# Patient Record
Sex: FEMALE | Race: WHITE | NOT HISPANIC OR LATINO | Employment: UNEMPLOYED | URBAN - METROPOLITAN AREA
[De-identification: names, ages, dates, MRNs, and addresses within clinical notes are randomized per-mention and may not be internally consistent; named-entity substitution may affect disease eponyms.]

---

## 2017-11-16 ENCOUNTER — LAB REQUISITION (OUTPATIENT)
Dept: LAB | Facility: HOSPITAL | Age: 22
End: 2017-11-16

## 2017-11-16 DIAGNOSIS — N91.0 PRIMARY AMENORRHEA: ICD-10-CM

## 2017-11-16 DIAGNOSIS — Z00.00 ROUTINE GENERAL MEDICAL EXAMINATION AT A HEALTH CARE FACILITY: ICD-10-CM

## 2017-11-16 PROCEDURE — 36415 COLL VENOUS BLD VENIPUNCTURE: CPT | Performed by: NURSE PRACTITIONER

## 2019-12-11 ENCOUNTER — LAB REQUISITION (OUTPATIENT)
Dept: LAB | Facility: HOSPITAL | Age: 24
End: 2019-12-11

## 2019-12-11 DIAGNOSIS — Z00.00 ROUTINE GENERAL MEDICAL EXAMINATION AT A HEALTH CARE FACILITY: ICD-10-CM

## 2019-12-11 LAB — HCG INTACT+B SERPL-ACNC: <0.5 MIU/ML

## 2019-12-11 PROCEDURE — 84702 CHORIONIC GONADOTROPIN TEST: CPT

## 2019-12-11 PROCEDURE — 36415 COLL VENOUS BLD VENIPUNCTURE: CPT | Performed by: NURSE PRACTITIONER

## 2019-12-20 ENCOUNTER — LAB REQUISITION (OUTPATIENT)
Dept: LAB | Facility: HOSPITAL | Age: 24
End: 2019-12-20

## 2019-12-20 DIAGNOSIS — Z00.00 ROUTINE GENERAL MEDICAL EXAMINATION AT A HEALTH CARE FACILITY: ICD-10-CM

## 2019-12-20 PROCEDURE — 36415 COLL VENOUS BLD VENIPUNCTURE: CPT | Performed by: OBSTETRICS & GYNECOLOGY

## 2020-12-14 ENCOUNTER — OFFICE VISIT (OUTPATIENT)
Dept: OBSTETRICS AND GYNECOLOGY | Facility: CLINIC | Age: 25
End: 2020-12-14

## 2020-12-14 VITALS
HEIGHT: 62 IN | SYSTOLIC BLOOD PRESSURE: 120 MMHG | WEIGHT: 127 LBS | TEMPERATURE: 97.7 F | DIASTOLIC BLOOD PRESSURE: 78 MMHG | BODY MASS INDEX: 23.37 KG/M2

## 2020-12-14 DIAGNOSIS — Z11.3 SCREENING EXAMINATION FOR STD (SEXUALLY TRANSMITTED DISEASE): ICD-10-CM

## 2020-12-14 DIAGNOSIS — Z72.0 TOBACCO ABUSE: ICD-10-CM

## 2020-12-14 DIAGNOSIS — Z01.419 WOMEN'S ANNUAL ROUTINE GYNECOLOGICAL EXAMINATION: Primary | ICD-10-CM

## 2020-12-14 PROCEDURE — 99395 PREV VISIT EST AGE 18-39: CPT | Performed by: OBSTETRICS & GYNECOLOGY

## 2020-12-14 RX ORDER — VARENICLINE TARTRATE 1 MG/1
1 TABLET, FILM COATED ORAL 2 TIMES DAILY
Qty: 56 TABLET | Refills: 1 | Status: SHIPPED | OUTPATIENT
Start: 2021-01-11 | End: 2021-03-08

## 2020-12-14 NOTE — PATIENT INSTRUCTIONS
Breast Self-Awareness  Breast self-awareness is knowing how your breasts look and feel. Doing breast self-awareness is important. It allows you to catch a breast problem early while it is still small and can be treated. All women should do breast self-awareness, including women who have had breast implants. Tell your doctor if you notice a change in your breasts.  What you need:  · A mirror.  · A well-lit room.  How to do a breast self-exam  A breast self-exam is one way to learn what is normal for your breasts and to check for changes. To do a breast self-exam:  Look for changes    1. Take off all the clothes above your waist.  2.  front of a mirror in a room with good lighting.  3. Put your hands on your hips.  4. Push your hands down.  5. Look at your breasts and nipples in the mirror to see if one breast or nipple looks different from the other. Check to see if:  ? The shape of one breast is different.  ? The size of one breast is different.  ? There are wrinkles, dips, and bumps in one breast and not the other.  6. Look at each breast for changes in the skin, such as:  ? Redness.  ? Scaly areas.  7. Look for changes in your nipples, such as:  ? Liquid around the nipples.  ? Bleeding.  ? Dimpling.  ? Redness.  ? A change in where the nipples are.  Feel for changes    1. Lie on your back on the floor.  2. Feel each breast. To do this, follow these steps:  ? Pick a breast to feel.  ? Put the arm closest to that breast above your head.  ? Use your other arm to feel the nipple area of your breast. Feel the area with the pads of your three middle fingers by making small circles with your fingers. For the first [place], press lightly. For the second [place], press harder. For the third [place], press even harder.  ? Keep making circles with your fingers at the different pressures as you move down your breast. Stop when you feel your ribs.  ? Move your fingers a little toward the center of your body.  ? Start  making circles with your fingers again, this time going up until you reach your collarbone.  ? Keep making up-and-down circles until you reach your armpit. Remember to keep using the three pressures.  ? Feel the other breast in the same way.  3. Sit or  the tub or shower.  4. With soapy water on your skin, feel each breast the same way you did in step 2 when you were lying on the floor.  Write down what you find  Writing down what you find can help you remember what to tell your doctor. Write down:  · What is normal for each breast.  · Any changes you find in each breast, including:  ? The kind of changes you find.  ? Whether you have pain.  ? Size and location of any lumps.  · When you last had your menstrual period.  General tips  · Check your breasts every month.  · If you are breastfeeding, the best time to check your breasts is after you feed your baby or after you use a breast pump.  · If you get menstrual periods, the best time to check your breasts is 5-7 days after your menstrual period is over.  · With time, you will become comfortable with the self-exam, and you will begin to know if there are changes in your breasts.  Contact a doctor if you:  · See a change in the shape or size of your breasts or nipples.  · See a change in the skin of your breast or nipples, such as red or scaly skin.  · Have fluid coming from your nipples that is not normal.  · Find a lump or thick area that was not there before.  · Have pain in your breasts.  · Have any concerns about your breast health.  Summary  · Breast self-awareness includes looking for changes in your breasts, as well as feeling for changes within your breasts.  · Breast self-awareness should be done in front of a mirror in a well-lit room.  · You should check your breasts every month. If you get menstrual periods, the best time to check your breasts is 5-7 days after your menstrual period is over.  · Let your doctor know of any changes you see in your  breasts, including changes in size, changes on the skin, pain or tenderness, or fluid from your nipples that is not normal.  This information is not intended to replace advice given to you by your health care provider. Make sure you discuss any questions you have with your health care provider.  Document Revised: 08/06/2019 Document Reviewed: 08/06/2019  Elsevier Patient Education © 2020 [employer]

## 2020-12-14 NOTE — PROGRESS NOTES
GYN Annual Exam     CC- Here for annual exam.     Subjective     HPI  Lluvia Franks is a 25 y.o. female established patient who presents for annual well woman exam. Her periods are regular every 28-30 days, lasting 5 days and are moderate and clots are sparse.  She denies  dysmenorrhea.  SPartner Status: Marital Status: single.  New Partners since last visit: YES/NO/Other: no.  Condom usage with IC: always.    The patient does not have any complaints today.    She exercises regularly: no.  She has concerns about domestic violence: no.    OB History        2    Para   1    Term   1            AB   1    Living   1       SAB        TAB        Ectopic        Molar        Multiple        Live Births   1                Current contraception: condoms  History of abnormal Pap smear: no  Family history of uterine, colon or ovarian cancer: yes - PGM: colon cancer  Family history of breast cancer: no  H/o STDs: Negative  Last pap: 2018  Gardasil:refuses  Thromboembolic Disease: none    Health Maintenance   Topic Date Due   • ANNUAL PHYSICAL  1998   • HPV VACCINES (1 - 2-dose series) 2006   • TDAP/TD VACCINES (1 - Tdap) 2014   • INFLUENZA VACCINE  2020   • HEPATITIS C SCREENING  2020   • PAP SMEAR  2021   • Annual Gynecologic Pelvic and Breast Exam  12/15/2021   • Pneumococcal Vaccine 0-64  Aged Out       The following portions of the patient's history were reviewed and updated as appropriate: allergies, current medications, past family history, past medical history, past social history, past surgical history and problem list.    Review of Systems  Review of Systems   Constitutional: Negative.    HENT: Negative.    Eyes: Negative.    Respiratory: Negative.    Cardiovascular: Negative.    Gastrointestinal: Negative.    Endocrine: Negative.    Genitourinary: Negative.    Musculoskeletal: Negative.    Skin: Negative.    Allergic/Immunologic: Negative.    Neurological:  "Negative.    Hematological: Negative.    Psychiatric/Behavioral: Negative.        I have reviewed and agree with the HPI, ROS, and historical information as entered above. Daphne Evans MD      Past Medical History:   Diagnosis Date   • Raynaud disease         Past Surgical History:   Procedure Laterality Date   • NO PAST SURGERIES        Lluvia Franks  has no history on file for tobacco.. She smokes 1 PPD.  I have educated her on the risk of diseases from using tobacco products such as cancer, COPD and heart disease.     I advised her to quit and she is willing to quit. We have discussed the following method/s for tobacco cessation:  Counseling Prescription Medicaiton.  Together we have set a quit date for 1 month from today.  She will follow up with me in several months or sooner to check on her progress.    I spent 5 minutes counseling the patient.           Objective   /78   Temp 97.7 °F (36.5 °C)   Ht 157.5 cm (62\")   Wt 57.6 kg (127 lb)   LMP 11/23/2020   Breastfeeding No   BMI 23.23 kg/m²     Physical Exam  Vitals signs and nursing note reviewed. Exam conducted with a chaperone present.   Constitutional:       General: She is awake.   HENT:      Head: Normocephalic and atraumatic.   Neck:      Musculoskeletal: Normal range of motion.      Thyroid: No thyroid mass, thyromegaly or thyroid tenderness.   Cardiovascular:      Rate and Rhythm: Normal rate.      Heart sounds: No murmur. No friction rub. No gallop.    Pulmonary:      Effort: Pulmonary effort is normal.      Breath sounds: Normal breath sounds. No stridor. No wheezing, rhonchi or rales.   Chest:      Chest wall: No mass or tenderness.      Breasts:         Right: Normal. No bleeding, inverted nipple, mass, nipple discharge, skin change or tenderness.         Left: Normal. No bleeding, inverted nipple, mass, nipple discharge, skin change or tenderness.   Abdominal:      Palpations: Abdomen is soft.      Tenderness: There is no guarding or " rebound.      Hernia: There is no hernia in the left inguinal area or right inguinal area.   Genitourinary:     General: Normal vulva.      Pubic Area: No rash.       Labia:         Right: No rash, tenderness, lesion or injury.         Left: No rash, tenderness, lesion or injury.       Urethra: No prolapse, urethral swelling or urethral lesion.      Vagina: No foreign body. No vaginal discharge, erythema, tenderness, bleeding or lesions.      Cervix: No cervical motion tenderness, discharge, friability, lesion, erythema or cervical bleeding.      Uterus: Normal. Not deviated, not enlarged, not fixed and not tender.       Adnexa: Right adnexa normal and left adnexa normal.        Right: No mass, tenderness or fullness.          Left: No mass, tenderness or fullness.        Rectum: No external hemorrhoid.   Lymphadenopathy:      Upper Body:      Right upper body: No supraclavicular or axillary adenopathy.      Left upper body: No supraclavicular or axillary adenopathy.   Skin:     General: Skin is warm.   Neurological:      Mental Status: She is alert and oriented to person, place, and time.   Psychiatric:         Mood and Affect: Mood and affect normal.         Speech: Speech normal.          Assessment   Assessment  Problem List Items Addressed This Visit     None      Visit Diagnoses     Women's annual routine gynecological examination    -  Primary    Relevant Orders    Pap IG, Ct-Ng, HPV-hr    Screening examination for STD (sexually transmitted disease)        Tobacco abuse                Assessment     Problem List Items Addressed This Visit     None      Visit Diagnoses     Women's annual routine gynecological examination    -  Primary    Relevant Orders    Pap IG, Ct-Ng, HPV-hr    Screening examination for STD (sexually transmitted disease)        Tobacco abuse                Plan     1. Encouraged use of condoms for STD prevention.  2. Recommended use of Vitamin D replacement and getting adequate calcium in  her diet. (1500mg)  3. Reviewed monthly self breast exams.  Instructed to call with lumps, pain, or breast discharge.    4. Reccommended Flu Vaccine in Fall of each year.  5. RTC in 1 year or PRN with problems  6. Happy with condoms  7. Declines Gardasil for now  8. Patient smokes 1 pack/day.  Was counseled on importance of tobacco cessation.  Trial of medical therapy.  Risks discussed.         Daphne Evans MD  12/14/2020

## 2020-12-29 DIAGNOSIS — Z01.419 WOMEN'S ANNUAL ROUTINE GYNECOLOGICAL EXAMINATION: ICD-10-CM

## 2022-01-10 ENCOUNTER — OFFICE VISIT (OUTPATIENT)
Dept: OBSTETRICS AND GYNECOLOGY | Facility: CLINIC | Age: 27
End: 2022-01-10

## 2022-01-10 VITALS
SYSTOLIC BLOOD PRESSURE: 100 MMHG | DIASTOLIC BLOOD PRESSURE: 80 MMHG | WEIGHT: 121 LBS | HEIGHT: 63 IN | BODY MASS INDEX: 21.44 KG/M2

## 2022-01-10 DIAGNOSIS — Z01.419 ENCOUNTER FOR GYNECOLOGICAL EXAMINATION WITHOUT ABNORMAL FINDING: Primary | ICD-10-CM

## 2022-01-10 DIAGNOSIS — Z01.419 PAP TEST, AS PART OF ROUTINE GYNECOLOGICAL EXAMINATION: ICD-10-CM

## 2022-01-10 PROCEDURE — 3008F BODY MASS INDEX DOCD: CPT | Performed by: OBSTETRICS & GYNECOLOGY

## 2022-01-10 PROCEDURE — 99395 PREV VISIT EST AGE 18-39: CPT | Performed by: OBSTETRICS & GYNECOLOGY

## 2022-01-10 PROCEDURE — 2014F MENTAL STATUS ASSESS: CPT | Performed by: OBSTETRICS & GYNECOLOGY

## 2022-01-10 NOTE — PROGRESS NOTES
Gynecologic Annual Exam Note        CC- Here for annual exam.     Subjective     HPI  Lluvia Franks is a 26 y.o. female established patient who presents for annual well woman exam. 2021.  Her periods are regular every 28-30 days, lasting 5 days and are moderate and clots are present.  She denies  dysmenorrhea.  SPartner Status: Marital Status: single.  New Partners since last visit: YES/NO/Other: no.  Condom usage with IC: always.    The patient does not have any complaints today.    She exercises regularly: yes.  She has concerns about domestic violence: no.    OB History        2    Para   1    Term   1            AB   1    Living   1       SAB        IAB        Ectopic        Molar        Multiple        Live Births   1                Current contraception: none  History of abnormal Pap smear: no  Family history of uterine, colon or ovarian cancer: yes - PGM colon   Family history of breast cancer: no  H/o STDs: no  Last pap:  Gardasil:declines     Last Pap :   Last Completed Pap Smear          PAP SMEAR (Every 3 Years) Next due on 12/15/2023    12/15/2020  Pap IG, Ct-Ng, HPV-hr    2018  Done - Negative, GC/Ch negative                Health Maintenance   Topic Date Due   • ANNUAL PHYSICAL  Never done   • COVID-19 Vaccine (1) Never done   • HPV VACCINES (1 - 2-dose series) Never done   • HEPATITIS C SCREENING  Never done   • INFLUENZA VACCINE  Never done   • Annual Gynecologic Pelvic and Breast Exam  12/15/2021   • PAP SMEAR  12/15/2023   • TDAP/TD VACCINES (3 - Td or Tdap) 2027   • Pneumococcal Vaccine 0-64  Aged Out       The following portions of the patient's history were reviewed and updated as appropriate: allergies, current medications, past family history, past medical history, past social history, past surgical history and problem list.    Review of Systems  Review of Systems    I have reviewed and agree with the HPI, ROS, and historical information as entered  "above. Daphne Evans MD      Past Medical History:   Diagnosis Date   • Raynaud disease         Past Surgical History:   Procedure Laterality Date   • NO PAST SURGERIES            Objective   /80   Ht 160 cm (63\")   Wt 54.9 kg (121 lb)   LMP 12/13/2021   BMI 21.43 kg/m²     Physical Exam  Vitals and nursing note reviewed. Exam conducted with a chaperone present.   Constitutional:       General: She is awake.   HENT:      Head: Normocephalic and atraumatic.   Neck:      Thyroid: No thyroid mass, thyromegaly or thyroid tenderness.   Cardiovascular:      Rate and Rhythm: Normal rate.      Heart sounds: No murmur heard.  No friction rub. No gallop.    Pulmonary:      Effort: Pulmonary effort is normal.      Breath sounds: Normal breath sounds. No stridor. No wheezing, rhonchi or rales.   Chest:      Chest wall: No mass or tenderness.   Breasts:      Right: Normal. No bleeding, inverted nipple, mass, nipple discharge, skin change, tenderness, axillary adenopathy or supraclavicular adenopathy.      Left: Normal. No bleeding, inverted nipple, mass, nipple discharge, skin change, tenderness, axillary adenopathy or supraclavicular adenopathy.       Abdominal:      Palpations: Abdomen is soft.      Tenderness: There is no guarding or rebound.      Hernia: There is no hernia in the left inguinal area or right inguinal area.   Genitourinary:     General: Normal vulva.      Pubic Area: No rash.       Labia:         Right: No rash, tenderness, lesion or injury.         Left: No rash, tenderness, lesion or injury.       Urethra: No prolapse, urethral swelling or urethral lesion.      Vagina: No foreign body. No vaginal discharge, erythema, tenderness, bleeding or lesions.      Cervix: No cervical motion tenderness, discharge, friability, lesion, erythema or cervical bleeding.      Uterus: Normal. Not deviated, not enlarged, not fixed and not tender.       Adnexa: Right adnexa normal and left adnexa normal.        Right: " No mass, tenderness or fullness.          Left: No mass, tenderness or fullness.        Rectum: No external hemorrhoid.   Musculoskeletal:      Cervical back: Normal range of motion.   Lymphadenopathy:      Upper Body:      Right upper body: No supraclavicular or axillary adenopathy.      Left upper body: No supraclavicular or axillary adenopathy.   Skin:     General: Skin is warm.   Neurological:      Mental Status: She is alert and oriented to person, place, and time.   Psychiatric:         Mood and Affect: Mood and affect normal.         Speech: Speech normal.          Assessment   Assessment  Problem List Items Addressed This Visit     None      Visit Diagnoses     Encounter for gynecological examination without abnormal finding    -  Primary    Pap test, as part of routine gynecological examination        Relevant Orders    IGP,CtNgTv,Apt HPV            Assessment     Problem List Items Addressed This Visit     None      Visit Diagnoses     Encounter for gynecological examination without abnormal finding    -  Primary    Pap test, as part of routine gynecological examination        Relevant Orders    IGP,CtNgTv,Apt HPV            Plan     1. Encouraged use of condoms for STD prevention.  2. Reviewed monthly self breast exams.  Instructed to call with lumps, pain, or breast discharge.    3. RTC in 1 year or PRN with problems  4. Happy with Condoms  5. Declines Gardasil         Daphne Evans MD  01/10/2022

## 2022-01-10 NOTE — PATIENT INSTRUCTIONS
Breast Self-Awareness  Breast self-awareness is knowing how your breasts look and feel. Doing breast self-awareness is important. It allows you to catch a breast problem early while it is still small and can be treated. All women should do breast self-awareness, including women who have had breast implants. Tell your doctor if you notice a change in your breasts.  What you need:  · A mirror.  · A well-lit room.  How to do a breast self-exam  A breast self-exam is one way to learn what is normal for your breasts and to check for changes. To do a breast self-exam:  Look for changes    1. Take off all the clothes above your waist.  2.  front of a mirror in a room with good lighting.  3. Put your hands on your hips.  4. Push your hands down.  5. Look at your breasts and nipples in the mirror to see if one breast or nipple looks different from the other. Check to see if:  ? The shape of one breast is different.  ? The size of one breast is different.  ? There are wrinkles, dips, and bumps in one breast and not the other.  6. Look at each breast for changes in the skin, such as:  ? Redness.  ? Scaly areas.  7. Look for changes in your nipples, such as:  ? Liquid around the nipples.  ? Bleeding.  ? Dimpling.  ? Redness.  ? A change in where the nipples are.    Feel for changes    1. Lie on your back on the floor.  2. Feel each breast. To do this, follow these steps:  ? Pick a breast to feel.  ? Put the arm closest to that breast above your head.  ? Use your other arm to feel the nipple area of your breast. Feel the area with the pads of your three middle fingers by making small circles with your fingers. For the first [place], press lightly. For the second [place], press harder. For the third [place], press even harder.  ? Keep making circles with your fingers at the different pressures as you move down your breast. Stop when you feel your ribs.  ? Move your fingers a little toward the center of your body.  ? Start  making circles with your fingers again, this time going up until you reach your collarbone.  ? Keep making up-and-down circles until you reach your armpit. Remember to keep using the three pressures.  ? Feel the other breast in the same way.  3. Sit or  the tub or shower.  4. With soapy water on your skin, feel each breast the same way you did in step 2 when you were lying on the floor.    Write down what you find  Writing down what you find can help you remember what to tell your doctor. Write down:  · What is normal for each breast.  · Any changes you find in each breast, including:  ? The kind of changes you find.  ? Whether you have pain.  ? Size and location of any lumps.  · When you last had your menstrual period.  General tips  · Check your breasts every month.  · If you are breastfeeding, the best time to check your breasts is after you feed your baby or after you use a breast pump.  · If you get menstrual periods, the best time to check your breasts is 5-7 days after your menstrual period is over.  · With time, you will become comfortable with the self-exam, and you will begin to know if there are changes in your breasts.  Contact a doctor if you:  · See a change in the shape or size of your breasts or nipples.  · See a change in the skin of your breast or nipples, such as red or scaly skin.  · Have fluid coming from your nipples that is not normal.  · Find a lump or thick area that was not there before.  · Have pain in your breasts.  · Have any concerns about your breast health.  Summary  · Breast self-awareness includes looking for changes in your breasts, as well as feeling for changes within your breasts.  · Breast self-awareness should be done in front of a mirror in a well-lit room.  · You should check your breasts every month. If you get menstrual periods, the best time to check your breasts is 5-7 days after your menstrual period is over.  · Let your doctor know of any changes you see in  your breasts, including changes in size, changes on the skin, pain or tenderness, or fluid from your nipples that is not normal.  This information is not intended to replace advice given to you by your health care provider. Make sure you discuss any questions you have with your health care provider.  Document Revised: 08/06/2019 Document Reviewed: 08/06/2019  [employer] Patient Education © 2021 [employer].

## 2022-03-01 ENCOUNTER — OFFICE VISIT (OUTPATIENT)
Dept: FAMILY MEDICINE CLINIC | Facility: CLINIC | Age: 27
End: 2022-03-01

## 2022-03-01 VITALS
TEMPERATURE: 97.6 F | RESPIRATION RATE: 16 BRPM | SYSTOLIC BLOOD PRESSURE: 98 MMHG | HEART RATE: 82 BPM | HEIGHT: 63 IN | DIASTOLIC BLOOD PRESSURE: 74 MMHG | BODY MASS INDEX: 20.55 KG/M2 | WEIGHT: 116 LBS

## 2022-03-01 DIAGNOSIS — Z00.00 WELL WOMAN EXAM (NO GYNECOLOGICAL EXAM): Primary | ICD-10-CM

## 2022-03-01 PROCEDURE — 99385 PREV VISIT NEW AGE 18-39: CPT | Performed by: FAMILY MEDICINE

## 2022-03-01 PROCEDURE — 3008F BODY MASS INDEX DOCD: CPT | Performed by: FAMILY MEDICINE

## 2022-03-01 PROCEDURE — 2014F MENTAL STATUS ASSESS: CPT | Performed by: FAMILY MEDICINE

## 2022-03-01 NOTE — PROGRESS NOTES
"Subjective     Chief Complaint   Patient presents with   • [facility]       Lluvia Franks is a 26 y.o. female who presents for an annual exam. The patient has no complaints today. The patient is sexually active. GYN screening history: last pap: approximate date  and was normal. The patient wears seatbelts: yes. The patient participates in regular exercise: yes. She walks.  The patient reports that there is not domestic violence in her life.     History of abnormal Pap smear: no  Family history of uterine or ovarian cancer: no  Family history of colon cancer: yes, grandmother  History of abnormal mammogram: no  Family history of breast cancer: no      Menstrual History:  OB History        2    Para   1    Term   1            AB   1    Living   1       SAB        IAB        Ectopic        Molar        Multiple        Live Births   1                 No LMP recorded.         The following portions of the patient's history were reviewed and updated as appropriate:vital signs, allergies, current medications, past family history, past medical history, past social history, past surgical history and problem list    Review of Systems   A comprehensive review of systems was negative.     Objective     BP 98/74   Pulse 82   Temp 97.6 °F (36.4 °C)   Resp 16   Ht 160 cm (63\")   Wt 52.6 kg (116 lb)   BMI 20.55 kg/m²       Physical Exam  Vitals and nursing note reviewed.   Constitutional:       General: She is not in acute distress.     Appearance: Normal appearance. She is well-developed.   HENT:      Head: Normocephalic and atraumatic.      Right Ear: Tympanic membrane, ear canal and external ear normal.      Left Ear: Tympanic membrane, ear canal and external ear normal.   Eyes:      Extraocular Movements: Extraocular movements intact.      Conjunctiva/sclera: Conjunctivae normal.   Neck:      Thyroid: No thyromegaly.   Cardiovascular:      Rate and Rhythm: Normal rate and regular rhythm.      Heart " sounds: Normal heart sounds. No murmur heard.      Pulmonary:      Effort: Pulmonary effort is normal. No respiratory distress.      Breath sounds: Normal breath sounds.   Abdominal:      General: Bowel sounds are normal. There is no distension.      Palpations: Abdomen is soft.      Tenderness: There is no abdominal tenderness.   Musculoskeletal:      Cervical back: Normal range of motion and neck supple.   Lymphadenopathy:      Cervical: No cervical adenopathy.   Skin:     General: Skin is warm and dry.   Neurological:      Mental Status: She is alert and oriented to person, place, and time.   Psychiatric:         Mood and Affect: Mood normal.         Behavior: Behavior normal.         Thought Content: Thought content normal.         Judgment: Judgment normal.             Assessment/Plan     ASSESSMENT  Healthy female exam.    1. Well woman exam (no gynecological exam)         PLAN  1. No orders of the defined types were placed in this encounter.  discussed rechecking her JOANN but rheumatologist reviewed labs and it was normal with them in the past and no longer having issues related to this original complaint in 2016    2. Medications prescribed this encounter:    No orders of the defined types were placed in this encounter.      3. Counseled to about well woman care and strongly encouraged her to quit smoking.  Various methods reviewed with her        Washington Delgado MD  3/1/2022

## 2022-03-25 ENCOUNTER — TELEPHONE (OUTPATIENT)
Dept: FAMILY MEDICINE CLINIC | Facility: CLINIC | Age: 27
End: 2022-03-25

## 2022-03-25 NOTE — TELEPHONE ENCOUNTER
Caller: Lluvia Franks    Relationship: Self    Best call back number: [phone]    What orders are you requesting (i.e. lab or imaging): LABS    In what timeframe would the patient need to come in: ASAP    Where will you receive your lab/imaging services: AT OFFICE    Additional notes: REQUESTING THYROID LEVELS, IRON     PLEASE CALL PATIENT FOR POSSIBLE OTHER TESTS

## 2022-04-04 ENCOUNTER — OFFICE VISIT (OUTPATIENT)
Dept: FAMILY MEDICINE CLINIC | Facility: CLINIC | Age: 27
End: 2022-04-04

## 2022-04-04 VITALS
BODY MASS INDEX: 20.38 KG/M2 | RESPIRATION RATE: 18 BRPM | TEMPERATURE: 98.4 F | HEIGHT: 63 IN | WEIGHT: 115 LBS | SYSTOLIC BLOOD PRESSURE: 114 MMHG | HEART RATE: 70 BPM | DIASTOLIC BLOOD PRESSURE: 82 MMHG

## 2022-04-04 DIAGNOSIS — R53.82 CHRONIC FATIGUE: ICD-10-CM

## 2022-04-04 DIAGNOSIS — L85.3 DRY SKIN: ICD-10-CM

## 2022-04-04 DIAGNOSIS — E61.1 LOW IRON: ICD-10-CM

## 2022-04-04 DIAGNOSIS — T14.8XXA BRUISING: Primary | ICD-10-CM

## 2022-04-04 DIAGNOSIS — R76.8 ANA POSITIVE: ICD-10-CM

## 2022-04-04 DIAGNOSIS — Z11.59 ENCOUNTER FOR HEPATITIS C SCREENING TEST FOR LOW RISK PATIENT: ICD-10-CM

## 2022-04-04 DIAGNOSIS — R07.81 RIB PAIN: ICD-10-CM

## 2022-04-04 PROCEDURE — 99214 OFFICE O/P EST MOD 30 MIN: CPT | Performed by: FAMILY MEDICINE

## 2022-04-04 RX ORDER — NAPROXEN 500 MG/1
500 TABLET ORAL 2 TIMES DAILY WITH MEALS
Qty: 60 TABLET | Refills: 0 | Status: SHIPPED | OUTPATIENT
Start: 2022-04-04 | End: 2022-05-25

## 2022-04-04 NOTE — PROGRESS NOTES
Subjective   Lluvia Franks is a 26 y.o. female.     History of Present Illness     She has had a long history of easily bruising for a long time and would like lab work completed  She has not had any bleeding issues that she is aware of    She is worried about her iron level    Last week she had some SOA    She has noted some pain all along her rib cage  The back of her ribs is better and now more in the front  No inciting injury but she had helped her sister move things 6 days prior to this      She has had some fatigue similar to past levels    The following portions of the patient's history were reviewed and updated as appropriate: allergies, current medications, past family history, past medical history, past social history, past surgical history and problem list.    Review of Systems   Constitutional: Positive for fatigue. Negative for unexpected weight change.   Respiratory: Negative.  Negative for shortness of breath.    Cardiovascular: Negative.  Negative for chest pain.   Gastrointestinal: Negative.  Negative for constipation.   Hematological: Bruises/bleeds easily.   Psychiatric/Behavioral: Negative.  Negative for dysphoric mood. The patient is not nervous/anxious.        Objective   Physical Exam  Vitals and nursing note reviewed.   Constitutional:       General: She is not in acute distress.     Appearance: Normal appearance. She is well-developed.   Cardiovascular:      Rate and Rhythm: Normal rate and regular rhythm.      Heart sounds: Normal heart sounds.   Pulmonary:      Effort: Pulmonary effort is normal.      Breath sounds: Normal breath sounds.   Musculoskeletal:        Arms:    Neurological:      Mental Status: She is alert and oriented to person, place, and time.   Psychiatric:         Mood and Affect: Mood normal.         Behavior: Behavior normal.         Thought Content: Thought content normal.         Judgment: Judgment normal.         Assessment/Plan   Diagnoses and all orders for this  visit:    1. Bruising (Primary)  -     CBC & Differential  -     Protime-INR    2. Chronic fatigue  -     Comprehensive Metabolic Panel  -     TSH    3. Dry skin  -     Comprehensive Metabolic Panel  -     TSH    4. Rib pain  -     naproxen (Naprosyn) 500 MG tablet; Take 1 tablet by mouth 2 (Two) Times a Day With Meals.  Dispense: 60 tablet; Refill: 0    5. JOANN positive  -     JOANN With / DsDNA, RNP, Sjogrens A / B, Smith    6. Low iron  -     CBC & Differential  -     Iron Profile    7. Encounter for hepatitis C screening test for low risk patient  -     Hepatitis C Antibody    discussed causes of bruising and she has never had any bleeding issues.  Will check INR and CBC.  No bruises noted on todays exam.  Will f/u pending labs  Will check iron as she had low levels in the past  Will check thyroid for dry skin  Naproxen for rib pains, no indication for CXR noted  Will recheck JOANN, was normal last time checked

## 2022-04-05 LAB
ALBUMIN SERPL-MCNC: 4.8 G/DL (ref 3.5–5.2)
ALBUMIN/GLOB SERPL: 2.4 G/DL
ALP SERPL-CCNC: 57 U/L (ref 39–117)
ALT SERPL-CCNC: 10 U/L (ref 1–33)
ANA SER QL: NEGATIVE
AST SERPL-CCNC: 14 U/L (ref 1–32)
BASOPHILS # BLD AUTO: 0.06 10*3/MM3 (ref 0–0.2)
BASOPHILS NFR BLD AUTO: 1.2 % (ref 0–1.5)
BILIRUB SERPL-MCNC: 0.4 MG/DL (ref 0–1.2)
BUN SERPL-MCNC: 7 MG/DL (ref 6–20)
BUN/CREAT SERPL: 7.4 (ref 7–25)
CALCIUM SERPL-MCNC: 10.1 MG/DL (ref 8.6–10.5)
CHLORIDE SERPL-SCNC: 103 MMOL/L (ref 98–107)
CO2 SERPL-SCNC: 22.9 MMOL/L (ref 22–29)
CREAT SERPL-MCNC: 0.94 MG/DL (ref 0.57–1)
EGFRCR SERPLBLD CKD-EPI 2021: 86 ML/MIN/1.73
EOSINOPHIL # BLD AUTO: 0.11 10*3/MM3 (ref 0–0.4)
EOSINOPHIL NFR BLD AUTO: 2.3 % (ref 0.3–6.2)
ERYTHROCYTE [DISTWIDTH] IN BLOOD BY AUTOMATED COUNT: 12.2 % (ref 12.3–15.4)
GLOBULIN SER CALC-MCNC: 2 GM/DL
GLUCOSE SERPL-MCNC: 77 MG/DL (ref 65–99)
HCT VFR BLD AUTO: 45.9 % (ref 34–46.6)
HCV AB S/CO SERPL IA: <0.1 S/CO RATIO (ref 0–0.9)
HGB BLD-MCNC: 15.8 G/DL (ref 12–15.9)
IMM GRANULOCYTES # BLD AUTO: 0.01 10*3/MM3 (ref 0–0.05)
IMM GRANULOCYTES NFR BLD AUTO: 0.2 % (ref 0–0.5)
INR PPP: 0.92 (ref 0.84–1.13)
IRON SATN MFR SERPL: 29 % (ref 20–50)
IRON SERPL-MCNC: 127 MCG/DL (ref 37–145)
LYMPHOCYTES # BLD AUTO: 1.71 10*3/MM3 (ref 0.7–3.1)
LYMPHOCYTES NFR BLD AUTO: 35.2 % (ref 19.6–45.3)
MCH RBC QN AUTO: 30.2 PG (ref 26.6–33)
MCHC RBC AUTO-ENTMCNC: 34.4 G/DL (ref 31.5–35.7)
MCV RBC AUTO: 87.8 FL (ref 79–97)
MONOCYTES # BLD AUTO: 0.38 10*3/MM3 (ref 0.1–0.9)
MONOCYTES NFR BLD AUTO: 7.8 % (ref 5–12)
NEUTROPHILS # BLD AUTO: 2.59 10*3/MM3 (ref 1.7–7)
NEUTROPHILS NFR BLD AUTO: 53.3 % (ref 42.7–76)
NRBC BLD AUTO-RTO: 0 /100 WBC (ref 0–0.2)
PLATELET # BLD AUTO: 221 10*3/MM3 (ref 140–450)
POTASSIUM SERPL-SCNC: 4.6 MMOL/L (ref 3.5–5.2)
PROT SERPL-MCNC: 6.8 G/DL (ref 6–8.5)
PROTHROMBIN TIME: 12.3 SECONDS (ref 11.4–14.4)
RBC # BLD AUTO: 5.23 10*6/MM3 (ref 3.77–5.28)
SODIUM SERPL-SCNC: 138 MMOL/L (ref 136–145)
TIBC SERPL-MCNC: 439 MCG/DL
TSH SERPL DL<=0.005 MIU/L-ACNC: 1.92 UIU/ML (ref 0.27–4.2)
UIBC SERPL-MCNC: 312 MCG/DL (ref 112–346)
WBC # BLD AUTO: 4.86 10*3/MM3 (ref 3.4–10.8)

## 2022-04-25 ENCOUNTER — OFFICE VISIT (OUTPATIENT)
Dept: OBSTETRICS AND GYNECOLOGY | Facility: CLINIC | Age: 27
End: 2022-04-25

## 2022-04-25 VITALS
BODY MASS INDEX: 20.38 KG/M2 | SYSTOLIC BLOOD PRESSURE: 128 MMHG | WEIGHT: 115 LBS | HEIGHT: 63 IN | DIASTOLIC BLOOD PRESSURE: 80 MMHG

## 2022-04-25 DIAGNOSIS — R23.2 HOT FLASHES: Primary | ICD-10-CM

## 2022-04-25 DIAGNOSIS — R11.0 NAUSEA WITHOUT VOMITING: ICD-10-CM

## 2022-04-25 PROCEDURE — 99212 OFFICE O/P EST SF 10 MIN: CPT | Performed by: OBSTETRICS & GYNECOLOGY

## 2022-04-25 NOTE — PROGRESS NOTES
Gynecologic Exam Note      Chief Complaint   Patient presents with   • gyn followup       Subjective   HPI  Lluvia Franks is a 26 y.o. female, , who presents for symptoms before starting period.      She states she has experienced this problem for 3 months.  She describes the severity as moderate.  She states that the problem is worsening.  The patient reports additional symptoms as Patient has been having night sweats 2-3 days before her periods.  This has been present for the past 3 months.  When her period starts, her symptoms resolve.   She reports her periods are regular and are typically about [id number].  She reports over the past few years she will experience some hot flashes around the time of her period, but never night sweats.     She also c/o nausea that typically begins just before her period and either resolves when her period starts, or 2-3 days after period starts.  Some months the nausea has continued for 2 weeks after her period began.  Her nausea will spontaneously resolve and she feels fine.  She has had no emesis with these symptoms.  She denies any pain at these times.  PT does note that the nausea symptoms began shortly after she had Covid.    Her last LMP was 04/10/2022.  Periods are regular every 28-30 days, lasting 5 days.  Dysmenorrhea:none.  Patient reports problems with: this last period patient states she spotted  for 6 days after her period.  Partner Status: Marital Status: single.  New Partners since last visit: no.  Desires STD Screening: no.    Additional OB/GYN History   Current contraception: contraceptive methods: Condoms  Desires to: do not start contraception  Last Pap : 01/10/2022 negative  Last Completed Pap Smear          PAP SMEAR (Every 3 Years) Next due on 1/10/2025    01/10/2022  PAP SMEAR SCANNED    12/15/2020  Pap IG, Ct-Ng, HPV-hr    2018  Done - Negative, GC/Ch negative              History of abnormal Pap smear: no  Last mammogram: na  Last  "Completed Mammogram     This patient has no relevant Health Maintenance data.        Tobacco Usage?: No   OB History        2    Para   1    Term   1            AB   1    Living   1       SAB        IAB        Ectopic        Molar        Multiple        Live Births   1                Health Maintenance   Topic Date Due   • COVID-19 Vaccine (1) Never done   • Pneumococcal Vaccine 0-64 (1 - PCV) Never done   • HPV VACCINES (1 - 2-dose series) Never done   • INFLUENZA VACCINE  2022   • Annual Gynecologic Pelvic and Breast Exam  2023   • ANNUAL PHYSICAL  2023   • PAP SMEAR  01/10/2025   • TDAP/TD VACCINES (3 - Td or Tdap) 2027   • HEPATITIS C SCREENING  Completed       The additional following portions of the patient's history were reviewed and updated as appropriate: allergies, current medications, past family history, past medical history, past social history, past surgical history and problem list.    Review of Systems    I have reviewed and agree with the HPI, ROS, and historical information as entered above. Daphne Evans MD    Objective   /80   Ht 160 cm (63\")   Wt 52.2 kg (115 lb)   LMP 04/10/2022   BMI 20.37 kg/m²     Physical Exam  Vitals and nursing note reviewed.   Constitutional:       General: She is not in acute distress.     Appearance: Normal appearance. She is well-developed.   HENT:      Head: Normocephalic and atraumatic.   Cardiovascular:      Rate and Rhythm: Normal rate and regular rhythm.      Heart sounds: Normal heart sounds. No murmur heard.    No friction rub. No gallop.   Pulmonary:      Effort: Pulmonary effort is normal. No respiratory distress or retractions.      Breath sounds: Normal breath sounds. No stridor. No wheezing, rhonchi or rales.   Abdominal:      General: There is no distension.      Palpations: Abdomen is soft. Abdomen is not rigid. There is no mass.      Tenderness: There is no abdominal tenderness. There is no guarding or " rebound.      Hernia: No hernia is present.   Neurological:      Mental Status: She is alert and oriented to person, place, and time.   Psychiatric:         Mood and Affect: Mood normal.         Behavior: Behavior normal.         Thought Content: Thought content normal.         Assessment/Plan     Assessment     Problem List Items Addressed This Visit    None     Visit Diagnoses     Hot flashes    -  Primary    Relevant Orders    Follicle Stimulating Hormone    Nausea without vomiting                Plan     No follow-ups on file.  1. Patient presents for evaluation of current hot flashes and night sweats and nausea that are occurring cyclically and just prior to her periods.  She notes the hot flashes and night sweats occur just prior to her cycles and resolve once her bleeding begins.  She has had the hot flashes for many years, but the night sweats have only begun in the last several months.  She also notes nausea that is not associated with vomiting that typically starts just prior to her cycles and at times will resolve with the onset of bleeding or 2 to 3 days into her periods.  She is also had a few months where the nausea persisted for approximately 2 weeks.  The symptoms spontaneously resolved and then she is asymptomatic and without complaint.  Her periods are regular and occur approximately every 30 days.  We will check an FSH to rule out premature ovarian failure secondary to the vasomotor symptoms.  In review of labs she recently had normal lab evaluation with her PCP on 4/4/2022.  These labs revealed a normal TSH, CBC and CMP.  I have offered her a trial of OCPs, but she declines this currently.  Of note, the symptoms began immediately after she had COVID in the fall.      Daphne Evans MD  04/25/2022

## 2022-04-26 LAB — FSH SERPL-ACNC: 4.7 MIU/ML

## 2022-05-04 ENCOUNTER — TELEPHONE (OUTPATIENT)
Dept: FAMILY MEDICINE CLINIC | Facility: CLINIC | Age: 27
End: 2022-05-04

## 2022-05-04 NOTE — TELEPHONE ENCOUNTER
----- Message from Lluvia Franks sent at 5/3/2022 11:04 PM EDT -----  Regarding: Question  I have a vein in the thigh part of my leg near my knee on the side that protrudes only when I'm standing, it seems to buldge out. Is that normal or should I have it checked out.

## 2022-05-11 ENCOUNTER — OFFICE VISIT (OUTPATIENT)
Dept: FAMILY MEDICINE CLINIC | Facility: CLINIC | Age: 27
End: 2022-05-11

## 2022-05-11 VITALS
RESPIRATION RATE: 18 BRPM | DIASTOLIC BLOOD PRESSURE: 76 MMHG | OXYGEN SATURATION: 100 % | BODY MASS INDEX: 19.95 KG/M2 | WEIGHT: 112.6 LBS | HEART RATE: 94 BPM | HEIGHT: 63 IN | TEMPERATURE: 97.7 F | SYSTOLIC BLOOD PRESSURE: 104 MMHG

## 2022-05-11 DIAGNOSIS — I83.92 ASYMPTOMATIC VARICOSE VEINS OF LEFT LOWER EXTREMITY: Primary | ICD-10-CM

## 2022-05-11 PROCEDURE — 99213 OFFICE O/P EST LOW 20 MIN: CPT | Performed by: FAMILY MEDICINE

## 2022-05-11 NOTE — PROGRESS NOTES
Subjective   Lluvia Franks is a 26 y.o. female.     History of Present Illness     Vein in her left leg the past week  It can be sore on occasion but she has ria pushing on it a lot  Only one spot  Not getting worse      Review of Systems   Constitutional: Negative.        Objective   Physical Exam  Vitals and nursing note reviewed.   Constitutional:       General: She is not in acute distress.     Appearance: Normal appearance. She is well-developed.   Cardiovascular:      Rate and Rhythm: Normal rate and regular rhythm.      Heart sounds: Normal heart sounds.   Pulmonary:      Effort: Pulmonary effort is normal.      Breath sounds: Normal breath sounds.   Skin:         Neurological:      Mental Status: She is alert and oriented to person, place, and time.   Psychiatric:         Mood and Affect: Mood normal.         Behavior: Behavior normal.         Thought Content: Thought content normal.         Judgment: Judgment normal.         Assessment & Plan   Diagnoses and all orders for this visit:    1. Asymptomatic varicose veins of left lower extremity (Primary)    reassurance, no intervention needed at this time.  Ok PRN iburpofen for discomfort  Monitor and f/u as needed or if pain is persistent and worse

## 2022-05-25 ENCOUNTER — OFFICE VISIT (OUTPATIENT)
Dept: FAMILY MEDICINE CLINIC | Facility: CLINIC | Age: 27
End: 2022-05-25

## 2022-05-25 VITALS
SYSTOLIC BLOOD PRESSURE: 116 MMHG | HEART RATE: 88 BPM | BODY MASS INDEX: 20.02 KG/M2 | RESPIRATION RATE: 18 BRPM | WEIGHT: 113 LBS | HEIGHT: 63 IN | DIASTOLIC BLOOD PRESSURE: 70 MMHG | TEMPERATURE: 98 F

## 2022-05-25 DIAGNOSIS — R42 VERTIGO: Primary | ICD-10-CM

## 2022-05-25 PROCEDURE — 99213 OFFICE O/P EST LOW 20 MIN: CPT | Performed by: FAMILY MEDICINE

## 2022-05-25 RX ORDER — MECLIZINE HYDROCHLORIDE 25 MG/1
TABLET ORAL
Qty: 40 TABLET | Refills: 1 | Status: SHIPPED | OUTPATIENT
Start: 2022-05-25 | End: 2022-08-23

## 2022-05-25 NOTE — PROGRESS NOTES
Subjective   Lluvia Franks is a 26 y.o. female.     History of Present Illness     For the past 9 days she has been having some intermittent dizziness  With first episode, she was driving and then things were spinning  This comes and goes  Feels like the ground is moving around  The episodes last a few seconds    Can be worse if she is looking around a lot    The following portions of the patient's history were reviewed and updated as appropriate: allergies, current medications, past family history, past medical history, past social history, past surgical history and problem list.    Review of Systems   Constitutional: Negative.    Psychiatric/Behavioral: Negative.        Objective   Physical Exam  Vitals and nursing note reviewed.   Constitutional:       Appearance: She is well-developed.   HENT:      Head: Normocephalic and atraumatic.      Right Ear: Hearing, tympanic membrane, ear canal and external ear normal.      Left Ear: Hearing, tympanic membrane, ear canal and external ear normal.      Nose: Nose normal.      Mouth/Throat:      Pharynx: Uvula midline.   Eyes:      Extraocular Movements: Extraocular movements intact.      Conjunctiva/sclera: Conjunctivae normal.      Pupils: Pupils are equal, round, and reactive to light.      Comments: No nystagmus   Cardiovascular:      Rate and Rhythm: Normal rate and regular rhythm.      Heart sounds: Normal heart sounds.   Pulmonary:      Effort: Pulmonary effort is normal.      Breath sounds: Normal breath sounds.   Musculoskeletal:      Cervical back: Normal range of motion.   Lymphadenopathy:      Cervical: No cervical adenopathy.   Psychiatric:         Behavior: Behavior normal.         Assessment & Plan   Diagnoses and all orders for this visit:    1. Vertigo (Primary)  -     meclizine (ANTIVERT) 25 MG tablet; 1/2-1 PO Q 6 hours PRN dizziness  Dispense: 40 tablet; Refill: 1    discussed etiology of vertigo.  Will use home epley maneuvers, written instructions  given.  Ok PRN meclizine  She will call back INB or ir worse, but she has noted that she does feel improvement the past 24 hours

## 2022-06-10 ENCOUNTER — OFFICE VISIT (OUTPATIENT)
Dept: FAMILY MEDICINE CLINIC | Facility: CLINIC | Age: 27
End: 2022-06-10

## 2022-06-10 VITALS
HEART RATE: 76 BPM | SYSTOLIC BLOOD PRESSURE: 118 MMHG | BODY MASS INDEX: 19.84 KG/M2 | DIASTOLIC BLOOD PRESSURE: 82 MMHG | TEMPERATURE: 98.4 F | RESPIRATION RATE: 18 BRPM | WEIGHT: 112 LBS | HEIGHT: 63 IN

## 2022-06-10 DIAGNOSIS — R63.4 WEIGHT LOSS: Primary | ICD-10-CM

## 2022-06-10 DIAGNOSIS — F34.1 DYSTHYMIA: ICD-10-CM

## 2022-06-10 PROCEDURE — 99214 OFFICE O/P EST MOD 30 MIN: CPT | Performed by: FAMILY MEDICINE

## 2022-06-10 NOTE — PROGRESS NOTES
Subjective   Lluvia Franks is a 26 y.o. female.     History of Present Illness     Others around her have noted that she has been losing weight  Pt has noted it but others have noted this  Her weight has been 116 in March and is down 4 pounds  She has no issues with her appetite, eats normally  No issues with eating and she has not noted a change in her appetite  No specific polyuria nor polydipsia but family was worried about DM    She has been anxious and worried  She is dealing with a lot of stress  Mod may be an issue but she wants to make sure no metabolic cause of weight loss is present      The following portions of the patient's history were reviewed and updated as appropriate: allergies, current medications, past family history, past medical history, past social history, past surgical history and problem list.    Review of Systems   Constitutional: Positive for unexpected weight change.   Respiratory: Negative.  Negative for shortness of breath.    Cardiovascular: Negative.  Negative for palpitations.   Endocrine: Negative.  Negative for polydipsia and polyuria.   Psychiatric/Behavioral: The patient is nervous/anxious.        Objective   Physical Exam  Vitals and nursing note reviewed.   Constitutional:       General: She is not in acute distress.     Appearance: Normal appearance. She is well-developed.   Neck:      Thyroid: No thyromegaly.   Cardiovascular:      Rate and Rhythm: Normal rate and regular rhythm.      Heart sounds: Normal heart sounds.   Pulmonary:      Effort: Pulmonary effort is normal.      Breath sounds: Normal breath sounds.   Neurological:      Mental Status: She is alert and oriented to person, place, and time.   Psychiatric:         Mood and Affect: Mood normal.         Behavior: Behavior normal.         Thought Content: Thought content normal.         Judgment: Judgment normal.         Assessment & Plan   Diagnoses and all orders for this visit:    1. Weight loss (Primary)  -      CBC & Differential  -     Comprehensive Metabolic Panel  -     TSH+Free T4  -     Insulin, Free & Total, Serum    2. Dysthymia    discussed caused of weight loss and will check labs.  They were normal just 2 months ago though.  Discussed mood as an issue and would consider lexapro pending outcome of labs.  Will discuss at that time, but she is not sure if she wants to try medicine at this time.  Plan to recheck weight in future

## 2022-06-13 ENCOUNTER — TELEPHONE (OUTPATIENT)
Dept: FAMILY MEDICINE CLINIC | Facility: CLINIC | Age: 27
End: 2022-06-13

## 2022-06-13 NOTE — TELEPHONE ENCOUNTER
Caller: Golden Lluvia    Relationship: Self    Best call back number: [phone]    Caller requesting test results: SELF    What test was performed: LABS    When was the test performed: Friday, 06/10/22    Where was the test performed: IN OFFICE    Additional notes: WANTING TO GET RESULTS

## 2022-06-15 NOTE — TELEPHONE ENCOUNTER
PATIENT IS CALLING TO CHECK ON THE STATUS OF HER LABS THAT WHERE TAKEN 5 DAYS AGO.   PLEASE CALL WHEN WE DO RECEIVE THEM

## 2022-06-19 LAB
ALBUMIN SERPL-MCNC: 5.1 G/DL (ref 3.9–5)
ALBUMIN/GLOB SERPL: 2.4 {RATIO} (ref 1.2–2.2)
ALP SERPL-CCNC: 64 IU/L (ref 44–121)
ALT SERPL-CCNC: 11 IU/L (ref 0–32)
AST SERPL-CCNC: 16 IU/L (ref 0–40)
BASOPHILS # BLD AUTO: 0.1 X10E3/UL (ref 0–0.2)
BASOPHILS NFR BLD AUTO: 1 %
BILIRUB SERPL-MCNC: 0.4 MG/DL (ref 0–1.2)
BUN SERPL-MCNC: 7 MG/DL (ref 6–20)
BUN/CREAT SERPL: 8 (ref 9–23)
CALCIUM SERPL-MCNC: 10.2 MG/DL (ref 8.7–10.2)
CHLORIDE SERPL-SCNC: 104 MMOL/L (ref 96–106)
CO2 SERPL-SCNC: 21 MMOL/L (ref 20–29)
CREAT SERPL-MCNC: 0.92 MG/DL (ref 0.57–1)
EGFRCR SERPLBLD CKD-EPI 2021: 88 ML/MIN/1.73
EOSINOPHIL # BLD AUTO: 0 X10E3/UL (ref 0–0.4)
EOSINOPHIL NFR BLD AUTO: 1 %
ERYTHROCYTE [DISTWIDTH] IN BLOOD BY AUTOMATED COUNT: 12.5 % (ref 11.7–15.4)
GLOBULIN SER CALC-MCNC: 2.1 G/DL (ref 1.5–4.5)
GLUCOSE SERPL-MCNC: 101 MG/DL (ref 65–99)
HCT VFR BLD AUTO: 47 % (ref 34–46.6)
HGB BLD-MCNC: 15.8 G/DL (ref 11.1–15.9)
IMM GRANULOCYTES # BLD AUTO: 0 X10E3/UL (ref 0–0.1)
IMM GRANULOCYTES NFR BLD AUTO: 0 %
INSULIN FREE SERPL-ACNC: 17 UU/ML
INSULIN SERPL-ACNC: 17 UU/ML
LYMPHOCYTES # BLD AUTO: 1.7 X10E3/UL (ref 0.7–3.1)
LYMPHOCYTES NFR BLD AUTO: 34 %
MCH RBC QN AUTO: 30 PG (ref 26.6–33)
MCHC RBC AUTO-ENTMCNC: 33.6 G/DL (ref 31.5–35.7)
MCV RBC AUTO: 89 FL (ref 79–97)
MONOCYTES # BLD AUTO: 0.4 X10E3/UL (ref 0.1–0.9)
MONOCYTES NFR BLD AUTO: 8 %
NEUTROPHILS # BLD AUTO: 2.8 X10E3/UL (ref 1.4–7)
NEUTROPHILS NFR BLD AUTO: 56 %
PLATELET # BLD AUTO: 298 X10E3/UL (ref 150–450)
POTASSIUM SERPL-SCNC: 4.7 MMOL/L (ref 3.5–5.2)
PROT SERPL-MCNC: 7.2 G/DL (ref 6–8.5)
RBC # BLD AUTO: 5.27 X10E6/UL (ref 3.77–5.28)
SODIUM SERPL-SCNC: 142 MMOL/L (ref 134–144)
T4 FREE SERPL-MCNC: 1.46 NG/DL (ref 0.82–1.77)
TSH SERPL DL<=0.005 MIU/L-ACNC: 2.93 UIU/ML (ref 0.45–4.5)
WBC # BLD AUTO: 5.1 X10E3/UL (ref 3.4–10.8)

## 2022-06-20 RX ORDER — ESCITALOPRAM OXALATE 10 MG/1
10 TABLET ORAL DAILY
Qty: 30 TABLET | Refills: 1 | Status: SHIPPED | OUTPATIENT
Start: 2022-06-20 | End: 2022-08-23

## 2022-06-30 ENCOUNTER — TELEPHONE (OUTPATIENT)
Dept: FAMILY MEDICINE CLINIC | Facility: CLINIC | Age: 27
End: 2022-06-30

## 2022-06-30 NOTE — TELEPHONE ENCOUNTER
Caller: MARY CARTY    Relationship to patient: SELF    Best call back number: [phone]    PATIENT STATES THAT SHE TESTED POSITIVE FOR COVID ON A HOME TEST TODAY.  SHE HAS A SORE THROAT, HOARSENESS AND DRAINAGE.  SHE IS WANTING TO KNOW HOW LONG SHE SHOULD QUARANTINE.

## 2022-06-30 NOTE — TELEPHONE ENCOUNTER
Please call.  The current recommendations are to quarantine for 10 days unless symptoms are better after 5 days.  If symptoms are better after 5 days, can come out of quarantine but need to wear a mask until the 10 days are completed.  If starts developing any chest pain or shortness of breath, she should let us know and/or go to the emergency room.    Otherwise treated like any other viral or cold symptoms.  Over-the-counter symptomatic relief and increasing fluids.

## 2022-08-23 ENCOUNTER — OFFICE VISIT (OUTPATIENT)
Dept: FAMILY MEDICINE CLINIC | Facility: CLINIC | Age: 27
End: 2022-08-23

## 2022-08-23 VITALS
SYSTOLIC BLOOD PRESSURE: 100 MMHG | WEIGHT: 115 LBS | OXYGEN SATURATION: 99 % | DIASTOLIC BLOOD PRESSURE: 62 MMHG | TEMPERATURE: 97.4 F | HEIGHT: 63 IN | HEART RATE: 90 BPM | BODY MASS INDEX: 20.38 KG/M2 | RESPIRATION RATE: 14 BRPM

## 2022-08-23 DIAGNOSIS — B35.3 TINEA PEDIS OF RIGHT FOOT: ICD-10-CM

## 2022-08-23 DIAGNOSIS — F41.9 ANXIETY: ICD-10-CM

## 2022-08-23 DIAGNOSIS — R25.1 TREMOR OF BOTH HANDS: Primary | ICD-10-CM

## 2022-08-23 LAB
EXPIRATION DATE: NORMAL
HBA1C MFR BLD: 4.9 %
Lab: NORMAL

## 2022-08-23 PROCEDURE — 99213 OFFICE O/P EST LOW 20 MIN: CPT | Performed by: PHYSICIAN ASSISTANT

## 2022-08-23 PROCEDURE — 83036 HEMOGLOBIN GLYCOSYLATED A1C: CPT | Performed by: PHYSICIAN ASSISTANT

## 2022-08-23 PROCEDURE — 3044F HG A1C LEVEL LT 7.0%: CPT | Performed by: PHYSICIAN ASSISTANT

## 2022-08-23 RX ORDER — BUSPIRONE HYDROCHLORIDE 5 MG/1
5 TABLET ORAL 3 TIMES DAILY
Qty: 90 TABLET | Refills: 1 | Status: SHIPPED | OUTPATIENT
Start: 2022-08-23 | End: 2022-09-21

## 2022-08-23 RX ORDER — CLOTRIMAZOLE AND BETAMETHASONE DIPROPIONATE 10; .64 MG/G; MG/G
1 CREAM TOPICAL 2 TIMES DAILY
Qty: 15 G | Refills: 1 | Status: SHIPPED | OUTPATIENT
Start: 2022-08-23 | End: 2022-10-21

## 2022-08-23 NOTE — PROGRESS NOTES
"Francy Franks is a 26 y.o. female.     History of Present Illness   Tremor of hands bilaterally off and on for 2 months   Both hands equal   R hand dominant   Does a lot of cooking, cleaning and on phone. 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 Struggled since she was a teenager. Sees therapy. Feels like this has been helping. Has never tried medication for symptoms, always nervous to do this   Constantly worried about blood sugar. Has not had issue with blood sugar in the past. Checks sugar at home and values between 90s-105     Does feel like anxiety makes hand tremor worse. Feels like shaking on \"the inside\"    Sometimes worse with fine movement activity     No concerning family hx of tremor.     Bouts of dizziness off and on for last few months   Labs within normal limits   Sometimes feels like equilibrium is off     Itchy rash on right foot she would like to have checked     The following portions of the patient's history were reviewed and updated as appropriate: allergies, current medications, past family history, past medical history, past social history, past surgical history and problem list.    Review of Systems  As noted per HPI    Objective    Blood pressure 100/62, pulse 90, temperature 97.4 °F (36.3 °C), resp. rate 14, height 160 cm (63\"), weight 52.2 kg (115 lb), SpO2 99 %, not currently breastfeeding.     Physical Exam  Vitals reviewed.   Constitutional:       Appearance: Normal appearance.   HENT:      Right Ear: External ear normal.      Left Ear: External ear normal.      Nose: Nose normal.   Cardiovascular:      Rate and Rhythm: Normal rate and regular rhythm.      Heart sounds: Normal heart sounds.   Pulmonary:      Effort: Pulmonary effort is normal.      Breath sounds: Normal breath sounds.   Musculoskeletal:      Comments: Area of dry, erythematous skin between 4th and 5th digit of right foot    Skin:     General: Skin is warm " and dry.   Neurological:      Mental Status: She is alert and oriented to person, place, and time.      Cranial Nerves: Cranial nerves are intact.      Sensory: Sensation is intact.      Motor: Motor function is intact.      Coordination: Coordination is intact. Coordination normal. Finger-Nose-Finger Test normal.      Comments: Fine tremor of both hands when outstretched. diminished when had patient count out loud to 10    Psychiatric:         Mood and Affect: Mood is anxious.         Behavior: Behavior normal.         Thought Content: Thought content normal.         Judgment: Judgment normal.         Assessment & Plan   Diagnoses and all orders for this visit:    1. Tremor of both hands (Primary)  -     POC Glycosylated Hemoglobin (Hb A1C)    2. Anxiety  -     busPIRone (BUSPAR) 5 MG tablet; Take 1 tablet by mouth 3 (Three) Times a Day.  Dispense: 90 tablet; Refill: 1    3. Tinea pedis of right foot  -     clotrimazole-betamethasone (Lotrisone) 1-0.05 % cream; Apply 1 application topically to the appropriate area as directed 2 (Two) Times a Day.  Dispense: 15 g; Refill: 1      Fine tremor noted in both hands when outstretched, however diminished when patient was distracted and asked to count to 10 out loud.   Labs reviewed and WNL. Believe symptoms may be secondary to anxiety   Trial of low dose buspar as noted. F/u if not improving for additional testing   Lotrisone for rash on foot. Dry foot precautions discussed   F/u with PCP PRN

## 2022-08-25 ENCOUNTER — TELEPHONE (OUTPATIENT)
Dept: FAMILY MEDICINE CLINIC | Facility: CLINIC | Age: 27
End: 2022-08-25

## 2022-09-21 ENCOUNTER — OFFICE VISIT (OUTPATIENT)
Dept: FAMILY MEDICINE CLINIC | Facility: CLINIC | Age: 27
End: 2022-09-21

## 2022-09-21 VITALS
WEIGHT: 117.4 LBS | BODY MASS INDEX: 20.8 KG/M2 | OXYGEN SATURATION: 99 % | HEIGHT: 63 IN | SYSTOLIC BLOOD PRESSURE: 120 MMHG | HEART RATE: 90 BPM | RESPIRATION RATE: 20 BRPM | DIASTOLIC BLOOD PRESSURE: 72 MMHG | TEMPERATURE: 97.5 F

## 2022-09-21 DIAGNOSIS — F41.1 GAD (GENERALIZED ANXIETY DISORDER): ICD-10-CM

## 2022-09-21 DIAGNOSIS — F44.4 PSYCHOGENIC TREMOR: Primary | ICD-10-CM

## 2022-09-21 PROCEDURE — 99213 OFFICE O/P EST LOW 20 MIN: CPT | Performed by: FAMILY MEDICINE

## 2022-09-21 NOTE — PROGRESS NOTES
"Chief Complaint   Patient presents with   • \"tremors\" in bilateral hands     Started in June / was seen for this last visit        Subjective      Lluvia Franks is a 26 y.o. who presents for tremors. This is follow up from last month. She did not take buspirone. She admits anxiety exacerbates her symptoms. She has associated palpitations, dyspnea, nausea. Her grandmother had a \"nervous tremor\". Anxiety disorder runs in the family. Patient has sought counseling and would prefer to avoid medicines    Review of Systems    Objective   Vital Signs:  /72   Pulse 90   Temp 97.5 °F (36.4 °C)   Resp 20   Ht 160 cm (62.99\")   Wt 53.3 kg (117 lb 6.4 oz)   SpO2 99%   BMI 20.80 kg/m²     BMI is within normal parameters. No other follow-up for BMI required.        Physical Exam  Cardiovascular:      Rate and Rhythm: Normal rate.      Pulses: Normal pulses.      Heart sounds: Normal heart sounds.   Pulmonary:      Effort: Pulmonary effort is normal.      Breath sounds: Normal breath sounds.   Neurological:      General: No focal deficit present.      Mental Status: She is alert.      Cranial Nerves: No cranial nerve deficit.      Sensory: No sensory deficit.      Coordination: Coordination normal.      Deep Tendon Reflexes: Reflexes normal.          Result Review   The following data was reviewed by: Amilcar Noriega MD on 09/21/2022:  Common labs    Common Labs 4/4/22 4/4/22 6/10/22 6/10/22 8/23/22    1204 1204 1117 1117    Glucose  77  101 (A)    BUN  7  7    Creatinine  0.94  0.92    Sodium  138  142    Potassium  4.6  4.7    Chloride  103  104    Calcium  10.1  10.2    Total Protein  6.8  7.2    Albumin  4.80  5.1 (A)    Total Bilirubin  0.4  0.4    Alkaline Phosphatase  57  64    AST (SGOT)  14  16    ALT (SGPT)  10  11    WBC 4.86  5.1     Hemoglobin 15.8  15.8     Hematocrit 45.9  47.0 (A)     Platelets 221  298     Hemoglobin A1C     4.9   (A) Abnormal value                          Assessment and " Plan  Diagnoses and all orders for this visit:    1. Psychogenic tremor (Primary)  Comments:  Whitney satisfied with explanantion. Will return if desires medicine for anxiety    2. CHRISTOPHER (generalized anxiety disorder)  Comments:  Currently in counseling. Will return for medication if she desires    Explained to whitney she does not have DM, abnormal thyroid, or heart disease and that anxiety is significant. Patient understands and will continue counseleing.        Follow Up  No follow-ups on file.  Patient was given instructions and counseling regarding her condition or for health maintenance advice. Please see specific information pulled into the AVS if appropriate.

## 2022-10-21 ENCOUNTER — OFFICE VISIT (OUTPATIENT)
Dept: FAMILY MEDICINE CLINIC | Facility: CLINIC | Age: 27
End: 2022-10-21

## 2022-10-21 VITALS
SYSTOLIC BLOOD PRESSURE: 122 MMHG | HEIGHT: 63 IN | HEART RATE: 87 BPM | TEMPERATURE: 98.7 F | OXYGEN SATURATION: 98 % | DIASTOLIC BLOOD PRESSURE: 66 MMHG | BODY MASS INDEX: 20.73 KG/M2 | RESPIRATION RATE: 18 BRPM | WEIGHT: 117 LBS

## 2022-10-21 DIAGNOSIS — B35.3 TINEA PEDIS OF RIGHT FOOT: Primary | ICD-10-CM

## 2022-10-21 PROCEDURE — 99213 OFFICE O/P EST LOW 20 MIN: CPT | Performed by: FAMILY MEDICINE

## 2022-10-21 RX ORDER — TERBINAFINE HYDROCHLORIDE 250 MG/1
250 TABLET ORAL DAILY
Qty: 10 TABLET | Refills: 0 | Status: SHIPPED | OUTPATIENT
Start: 2022-10-21 | End: 2023-03-02

## 2022-10-21 RX ORDER — CLOTRIMAZOLE 1 %
1 CREAM (GRAM) TOPICAL 2 TIMES DAILY
Qty: 28 G | Refills: 1 | Status: SHIPPED | OUTPATIENT
Start: 2022-10-21

## 2022-10-21 NOTE — PROGRESS NOTES
Subjective   Lluvia Franks is a 26 y.o. female.     History of Present Illness     For the past 4 months she has had some irritation on the skin of her R foot  They gave her lotrisone in August and this did not hel  The skin is flaking and cracking and mainly on and between her toes    Review of Systems   Constitutional: Negative.    Skin: Positive for rash.       Objective   Physical Exam  Vitals and nursing note reviewed.   Constitutional:       General: She is not in acute distress.     Appearance: Normal appearance. She is well-developed.   Cardiovascular:      Rate and Rhythm: Normal rate and regular rhythm.      Heart sounds: Normal heart sounds.   Pulmonary:      Effort: Pulmonary effort is normal.      Breath sounds: Normal breath sounds.   Musculoskeletal:        Feet:    Neurological:      Mental Status: She is alert and oriented to person, place, and time.   Psychiatric:         Mood and Affect: Mood normal.         Behavior: Behavior normal.         Thought Content: Thought content normal.         Judgment: Judgment normal.         Assessment & Plan   Diagnoses and all orders for this visit:    1. Tinea pedis of right foot (Primary)  -     terbinafine (LamISIL) 250 MG tablet; Take 1 tablet by mouth Daily.  Dispense: 10 tablet; Refill: 0  -     clotrimazole (LOTRIMIN) 1 % cream; Apply 1 application topically to the appropriate area as directed 2 (Two) Times a Day.  Dispense: 28 g; Refill: 1    clotrimazole and PO lamisil.  Call back INB

## 2022-11-10 ENCOUNTER — TELEPHONE (OUTPATIENT)
Dept: FAMILY MEDICINE CLINIC | Facility: CLINIC | Age: 27
End: 2022-11-10

## 2022-11-10 DIAGNOSIS — R25.1 TREMOR: Primary | ICD-10-CM

## 2022-11-10 NOTE — TELEPHONE ENCOUNTER
Caller: Lluvia Franks    Relationship: Self    Best call back number:  [phone]     What is the medical concern/diagnosis: HAND TREMBLING, DIZZINESS  FOR 4 1/2 MONTHS     What specialty or service is being requested:  NEUROLOGIST     What is the provider, practice or medical service name:     What is the office location:  [facility] IF POSSIBLE BUT IF NOT THAT IS OKAY       Any additional details:  PLEASE CALL IF REFERRAL CAN BE PUT IN

## 2022-12-28 ENCOUNTER — TELEPHONE (OUTPATIENT)
Dept: FAMILY MEDICINE CLINIC | Facility: CLINIC | Age: 27
End: 2022-12-28

## 2022-12-28 NOTE — TELEPHONE ENCOUNTER
----- Message from Lluvia Franks sent at 12/28/2022  9:45 AM EST -----  Regarding: Nose polyp  Contact: [phone]  Hello, I recently had a CT scan of my head, they told me I had a 16mm polyp in my left maxillary sinis. I wasn't sure what that was or what can be done for it if anything needed to be done. Thanks

## 2022-12-30 NOTE — TELEPHONE ENCOUNTER
Who ordered T? Where was it done?  I am not seeing CT in chart.  May want to contact whomever ordered the CT as I do not have it to review

## 2023-03-02 ENCOUNTER — OFFICE VISIT (OUTPATIENT)
Dept: FAMILY MEDICINE CLINIC | Facility: CLINIC | Age: 28
End: 2023-03-02
Payer: COMMERCIAL

## 2023-03-02 VITALS
BODY MASS INDEX: 21.23 KG/M2 | WEIGHT: 119.8 LBS | RESPIRATION RATE: 20 BRPM | SYSTOLIC BLOOD PRESSURE: 120 MMHG | HEART RATE: 88 BPM | TEMPERATURE: 98.2 F | DIASTOLIC BLOOD PRESSURE: 80 MMHG | HEIGHT: 63 IN

## 2023-03-02 DIAGNOSIS — R52 BODY ACHES: ICD-10-CM

## 2023-03-02 DIAGNOSIS — J06.9 ACUTE URI: Primary | ICD-10-CM

## 2023-03-02 LAB
EXPIRATION DATE: NORMAL
EXPIRATION DATE: NORMAL
FLUAV AG UPPER RESP QL IA.RAPID: NOT DETECTED
FLUBV AG UPPER RESP QL IA.RAPID: NOT DETECTED
INTERNAL CONTROL: NORMAL
INTERNAL CONTROL: NORMAL
Lab: NORMAL
Lab: NORMAL
S PYO AG THROAT QL: NEGATIVE
SARS-COV-2 AG UPPER RESP QL IA.RAPID: NOT DETECTED

## 2023-03-02 PROCEDURE — 99213 OFFICE O/P EST LOW 20 MIN: CPT | Performed by: FAMILY MEDICINE

## 2023-03-02 PROCEDURE — 87880 STREP A ASSAY W/OPTIC: CPT | Performed by: FAMILY MEDICINE

## 2023-03-02 PROCEDURE — 87428 SARSCOV & INF VIR A&B AG IA: CPT | Performed by: FAMILY MEDICINE

## 2023-03-02 RX ORDER — PSEUDOEPHEDRINE HCL 30 MG
30 TABLET ORAL EVERY 4 HOURS PRN
Qty: 30 TABLET | Refills: 0 | Status: SHIPPED | OUTPATIENT
Start: 2023-03-02

## 2023-03-02 NOTE — PROGRESS NOTES
"Chief Complaint   Patient presents with   • headache, chills & sweats, body aches, runny nose      Started yesterday        Subjective      Lluvia Franks is a 27 y.o. who presents for 1 day of nasal congestion, throat irritation, slight cough, temperature to 99.7 and chills.    Objective   Vital Signs:  /80   Pulse 88   Temp 98.2 °F (36.8 °C)   Resp 20   Ht 160 cm (63\")   Wt 54.3 kg (119 lb 12.8 oz)   BMI 21.22 kg/m²     Physical Exam  Constitutional:       Appearance: Normal appearance.   HENT:      Head: Normocephalic and atraumatic.      Right Ear: Tympanic membrane and ear canal normal.      Left Ear: Tympanic membrane and ear canal normal.      Nose: Nose normal.      Mouth/Throat:      Mouth: Mucous membranes are moist.      Pharynx: Oropharynx is clear. Posterior oropharyngeal erythema present.   Eyes:      Conjunctiva/sclera: Conjunctivae normal.   Cardiovascular:      Rate and Rhythm: Normal rate and regular rhythm.      Heart sounds: Normal heart sounds. No murmur heard.  Pulmonary:      Effort: Pulmonary effort is normal. No respiratory distress.      Breath sounds: Normal breath sounds.   Musculoskeletal:      Cervical back: Normal range of motion and neck supple. No tenderness.   Lymphadenopathy:      Cervical: No cervical adenopathy.   Skin:     General: Skin is warm and dry.   Neurological:      Mental Status: She is alert.          Result Review   The following data was reviewed by: Amilcar Noriega MD on 03/02/2023:      Data reviewed: Point-of-care COVID/flu test negative              Assessment and Plan  Diagnoses and all orders for this visit:    1. Body aches (Primary)  -     POCT SARS-CoV-2 Antigen JORJE + Flu            Follow Up  No follow-ups on file.  Patient was given instructions and counseling regarding her condition or for health maintenance advice. Please see specific information pulled into the AVS if appropriate.       "

## 2023-03-02 NOTE — PROGRESS NOTES
"Chief Complaint   Patient presents with   • headache, chills & sweats, body aches, runny nose      Started yesterday        Subjective      Lluvia Franks is a 27 y.o. who presents for 1 day of throat irritation, nasal congestion, slight cough and elevated temp to 99.7 with chills.     Objective   Vital Signs:  /80   Pulse 88   Temp 98.2 °F (36.8 °C)   Resp 20   Ht 160 cm (63\")   Wt 54.3 kg (119 lb 12.8 oz)   BMI 21.22 kg/m²     Physical Exam  Constitutional:       Appearance: Normal appearance.   HENT:      Head: Normocephalic and atraumatic.      Right Ear: Tympanic membrane and ear canal normal.      Left Ear: Tympanic membrane and ear canal normal.      Nose: Nose normal.      Mouth/Throat:      Mouth: Mucous membranes are moist.      Pharynx: Oropharynx is clear.   Eyes:      Conjunctiva/sclera: Conjunctivae normal.   Cardiovascular:      Rate and Rhythm: Normal rate and regular rhythm.      Heart sounds: Normal heart sounds. No murmur heard.  Pulmonary:      Effort: Pulmonary effort is normal. No respiratory distress.      Breath sounds: Normal breath sounds.   Musculoskeletal:      Cervical back: Normal range of motion and neck supple. No tenderness.   Lymphadenopathy:      Cervical: No cervical adenopathy.   Skin:     General: Skin is warm and dry.   Neurological:      Mental Status: She is alert.   Psychiatric:         Mood and Affect: Mood normal.          Result Review   The following data was reviewed by: Amilcar Noriega MD on 03/02/2023:    Data reviewed: POCT Strep A/Flu/Covid Negative              Assessment and Plan  Diagnoses and all orders for this visit:    1. Acute URI (Primary)  Comments:  Treat symtoms with analgesics and antihistamines  Orders:  -     pseudoephedrine (Sudafed) 30 MG tablet; Take 1 tablet by mouth Every 4 (Four) Hours As Needed for Congestion.  Dispense: 30 tablet; Refill: 0    2. Body aches  -     POCT SARS-CoV-2 Antigen JORJE + Flu  -     POC Rapid Strep " A            Follow Up  No follow-ups on file.  Patient was given instructions and counseling regarding her condition or for health maintenance advice. Please see specific information pulled into the AVS if appropriate.

## 2023-05-15 ENCOUNTER — OFFICE VISIT (OUTPATIENT)
Dept: OBSTETRICS AND GYNECOLOGY | Facility: CLINIC | Age: 28
End: 2023-05-15
Payer: COMMERCIAL

## 2023-05-15 VITALS
BODY MASS INDEX: 21.97 KG/M2 | DIASTOLIC BLOOD PRESSURE: 72 MMHG | RESPIRATION RATE: 18 BRPM | SYSTOLIC BLOOD PRESSURE: 116 MMHG | WEIGHT: 124 LBS | HEIGHT: 63 IN

## 2023-05-15 DIAGNOSIS — Z20.2 POSSIBLE EXPOSURE TO STD: ICD-10-CM

## 2023-05-15 DIAGNOSIS — Z01.419 ENCOUNTER FOR GYNECOLOGICAL EXAMINATION WITHOUT ABNORMAL FINDING: Primary | ICD-10-CM

## 2023-05-15 NOTE — PROGRESS NOTES
Gynecologic Annual Exam Note        CC- Here for annual exam.     Subjective     HPI  Lluvia Franks is a 27 y.o. female established patient who presents for annual well woman exam. Patient's last menstrual period was 2023 (exact date)..  Her periods are regular every 28-30 days, lasting 5 days and are moderate and clots are sparse.  She denies  dysmenorrhea.  SPartner Status: Marital Status: single.  New Partners since last visit: YES/NO/Other: no.  Condom usage with IC: always.    The patient does not have any complaints today.    She exercises regularly: no.  She has concerns about domestic violence: no.    OB History        2    Para   1    Term   1            AB   1    Living   1       SAB        IAB        Ectopic        Molar        Multiple        Live Births   1                Current contraception: condoms  History of abnormal Pap smear: no  Family history of uterine, colon or ovarian cancer: yes - Colon Cancer-PGM,Cervical Cancer-Sister   Family history of breast cancer: no  H/o STDs: no  Last pap:*01/10/2022 Negative HPV Negative   Gardasil:refuses    Last Pap : 01/10/2022 Negative HPV Negative   Last Completed Pap Smear          PAP SMEAR (Every 3 Years) Next due on 1/10/2025    01/10/2022  PAP SMEAR SCANNED    12/15/2020  Pap IG, Ct-Ng, HPV-hr    2018  Done - Negative, GC/Ch negative                Health Maintenance   Topic Date Due   • COVID-19 Vaccine (1) Never done   • Pneumococcal Vaccine 0-64 (1 - PCV) Never done   • Annual Gynecologic Pelvic and Breast Exam  2023   • ANNUAL PHYSICAL  2023   • INFLUENZA VACCINE  2023   • PAP SMEAR  01/10/2025   • TDAP/TD VACCINES (3 - Td or Tdap) 2027   • HEPATITIS C SCREENING  Completed   • CHLAMYDIA SCREENING  Discontinued       The following portions of the patient's history were reviewed and updated as appropriate: allergies, current medications, past family history, past medical history, past social  "history, past surgical history and problem list.    Review of Systems  Review of Systems    I have reviewed and agree with the HPI, ROS, and historical information as entered above. Daphne Evans MD      Past Medical History:   Diagnosis Date   • Raynaud disease         Past Surgical History:   Procedure Laterality Date   • NO PAST SURGERIES     • NO PAST SURGERIES            Objective   /72   Resp 18   Ht 160 cm (62.99\")   Wt 56.2 kg (124 lb)   LMP 05/08/2023 (Exact Date)   BMI 21.97 kg/m²     Physical Exam  Vitals and nursing note reviewed. Exam conducted with a chaperone present.   Constitutional:       General: She is awake.   HENT:      Head: Normocephalic and atraumatic.   Neck:      Thyroid: No thyroid mass, thyromegaly or thyroid tenderness.   Cardiovascular:      Rate and Rhythm: Normal rate.      Heart sounds: No murmur heard.    No friction rub. No gallop.   Pulmonary:      Effort: Pulmonary effort is normal.      Breath sounds: Normal breath sounds. No stridor. No wheezing, rhonchi or rales.   Chest:      Chest wall: No mass or tenderness.   Breasts:     Right: Normal. No bleeding, inverted nipple, mass, nipple discharge, skin change or tenderness.      Left: Normal. No bleeding, inverted nipple, mass, nipple discharge, skin change or tenderness.   Abdominal:      Palpations: Abdomen is soft.      Tenderness: There is no guarding or rebound.      Hernia: There is no hernia in the left inguinal area or right inguinal area.   Genitourinary:     General: Normal vulva.      Pubic Area: No rash.       Labia:         Right: No rash, tenderness, lesion or injury.         Left: No rash, tenderness, lesion or injury.       Urethra: No prolapse, urethral swelling or urethral lesion.      Vagina: No foreign body. No vaginal discharge, erythema, tenderness, bleeding or lesions.      Cervix: No cervical motion tenderness, discharge, friability, lesion, erythema or cervical bleeding.      Uterus: Normal. " Not deviated, not enlarged, not fixed and not tender.       Adnexa: Right adnexa normal and left adnexa normal.        Right: No mass, tenderness or fullness.          Left: No mass, tenderness or fullness.        Rectum: No external hemorrhoid.   Musculoskeletal:      Cervical back: Normal range of motion.   Lymphadenopathy:      Upper Body:      Right upper body: No supraclavicular or axillary adenopathy.      Left upper body: No supraclavicular or axillary adenopathy.   Skin:     General: Skin is warm.   Neurological:      Mental Status: She is alert and oriented to person, place, and time.   Psychiatric:         Mood and Affect: Mood and affect normal.         Speech: Speech normal.          Assessment   Assessment  Problem List Items Addressed This Visit    None  Visit Diagnoses     Encounter for gynecological examination without abnormal finding    -  Primary    Possible exposure to STD        Relevant Orders    Chlamydia trachomatis, Neisseria gonorrhoeae, Trichomonas vaginalis, PCR - Swab, Cervix            Assessment     Problem List Items Addressed This Visit    None  Visit Diagnoses     Encounter for gynecological examination without abnormal finding    -  Primary    Possible exposure to STD        Relevant Orders    Chlamydia trachomatis, Neisseria gonorrhoeae, Trichomonas vaginalis, PCR - Swab, Cervix            Plan     1. Encouraged use of condoms for STD prevention.  2. Reviewed monthly self breast exams.  Instructed to call with lumps, pain, or breast discharge.    3. RTC in 1 year or PRN with problems  4. Discussed Gardasil, pt considering.           Daphne Evans MD  05/15/2023

## 2023-05-17 LAB
C TRACH RRNA SPEC QL NAA+PROBE: NEGATIVE
N GONORRHOEA RRNA SPEC QL NAA+PROBE: NEGATIVE
T VAGINALIS RRNA SPEC QL NAA+PROBE: NEGATIVE

## 2023-07-28 ENCOUNTER — OFFICE VISIT (OUTPATIENT)
Dept: OBSTETRICS AND GYNECOLOGY | Facility: CLINIC | Age: 28
End: 2023-07-28
Payer: COMMERCIAL

## 2023-07-28 VITALS
BODY MASS INDEX: 21.97 KG/M2 | WEIGHT: 124 LBS | HEIGHT: 63 IN | DIASTOLIC BLOOD PRESSURE: 70 MMHG | SYSTOLIC BLOOD PRESSURE: 102 MMHG

## 2023-07-28 DIAGNOSIS — R10.2 PELVIC PAIN: ICD-10-CM

## 2023-07-28 DIAGNOSIS — N92.6 MISSED PERIOD: Primary | ICD-10-CM

## 2023-07-28 DIAGNOSIS — N92.0 SPOTTING: ICD-10-CM

## 2023-07-28 LAB
B-HCG UR QL: NEGATIVE
BILIRUB BLD-MCNC: NEGATIVE MG/DL
EXPIRATION DATE: 1
GLUCOSE UR STRIP-MCNC: NEGATIVE MG/DL
INTERNAL NEGATIVE CONTROL: NEGATIVE
INTERNAL POSITIVE CONTROL: POSITIVE
KETONES UR QL: NEGATIVE
LEUKOCYTE EST, POC: NEGATIVE
Lab: NORMAL
NITRITE UR-MCNC: NEGATIVE MG/ML
PROT UR STRIP-MCNC: NEGATIVE MG/DL
RBC # UR STRIP: NEGATIVE /UL
UROBILINOGEN UR QL: NORMAL

## 2023-07-28 NOTE — PROGRESS NOTES
Chief Complaint   Patient presents with    Vaginal Bleeding       Subjective   HPI  Lluvia Franks is a 27 y.o. female, . Her last LMP was 23. who presents with abnormal spotting.  Started spotting on the  and continued till the . . Patient is sexually active and reports using condoms. Patient denies having any discharge patient states had a sharp pain the week before she started period with fullness and dull achy pain which has resolved.  Patient states has been stressed but no more then usual and had not weight changes.   The patient reports additional symptoms as none.    She denies new partners      Additional OB/GYN History     Last Pap : 01/10/2022. Negative, negative HPV  Last Completed Pap Smear            PAP SMEAR (Every 3 Years) Next due on 1/10/2025      01/10/2022  PAP SMEAR SCANNED    12/15/2020  Pap IG, Ct-Ng, HPV-hr    2018  Done - Negative, GC/Ch negative                    Last mammogram: na  Last Completed Mammogram       This patient has no relevant Health Maintenance data.            Tobacco Usage?: No   OB History          2    Para   1    Term   1            AB   1    Living   1         SAB        IAB        Ectopic        Molar        Multiple        Live Births   1                No current outpatient medications on file.     Past Medical History:   Diagnosis Date    Raynaud disease         Past Surgical History:   Procedure Laterality Date    NO PAST SURGERIES      NO PAST SURGERIES         The additional following portions of the patient's history were reviewed and updated as appropriate: allergies and current medications.    Review of Systems   Constitutional: Negative.    Respiratory: Negative.     Cardiovascular: Negative.    Gastrointestinal: Negative.    Genitourinary:  Positive for pelvic pain and vaginal bleeding.     I have reviewed and agree with the HPI, ROS, and historical information as entered above. Sachi KOTHARI  "Lenora, TRISTEN      Objective   /70   Ht 160 cm (62.99\")   Wt 56.2 kg (124 lb)   LMP 07/15/2023   BMI 21.97 kg/m²     Physical Exam  Vitals and nursing note reviewed. Exam conducted with a chaperone present.   Constitutional:       Appearance: Normal appearance.   Pulmonary:      Effort: Pulmonary effort is normal.   Abdominal:      Palpations: Abdomen is soft.   Genitourinary:     Labia:         Right: No rash, tenderness or lesion.         Left: No rash, tenderness or lesion.       Vagina: Normal. No lesions.      Cervix: No cervical motion tenderness, discharge, lesion or cervical bleeding.      Uterus: Normal. Not enlarged, not fixed and not tender.       Adnexa:         Right: No mass or tenderness.          Left: No mass or tenderness.        Rectum: No external hemorrhoid.      Comments: Chaperone Present  Neurological:      Mental Status: She is alert.       Assessment & Plan     Assessment     Problem List Items Addressed This Visit    None  Visit Diagnoses       Missed period    -  Primary    Relevant Orders    POC Pregnancy, Urine (Completed)    US Non-ob Transvaginal    Pelvic pain        Relevant Orders    POC Urinalysis Dipstick (Completed)    US Non-ob Transvaginal    Spotting        Relevant Orders    US Non-ob Transvaginal            UPT and CCUA negative. Exam normal with no further bleeding. Could have just been one abnormal cycle.     Plan     Return in about 1 week (around 8/4/2023) for U/S , Annual physical.        Sachi Cooley, TRISTEN  07/28/2023   "

## 2023-08-04 ENCOUNTER — OFFICE VISIT (OUTPATIENT)
Dept: OBSTETRICS AND GYNECOLOGY | Facility: CLINIC | Age: 28
End: 2023-08-04
Payer: COMMERCIAL

## 2023-08-04 VITALS
SYSTOLIC BLOOD PRESSURE: 112 MMHG | WEIGHT: 124 LBS | HEIGHT: 63 IN | DIASTOLIC BLOOD PRESSURE: 80 MMHG | BODY MASS INDEX: 21.97 KG/M2

## 2023-08-04 DIAGNOSIS — N92.6 ABNORMAL MENSES: ICD-10-CM

## 2023-08-04 DIAGNOSIS — N84.0 ENDOMETRIAL POLYP: Primary | ICD-10-CM

## 2023-08-04 PROCEDURE — 99213 OFFICE O/P EST LOW 20 MIN: CPT | Performed by: NURSE PRACTITIONER

## 2023-11-06 ENCOUNTER — OFFICE VISIT (OUTPATIENT)
Dept: OBSTETRICS AND GYNECOLOGY | Facility: CLINIC | Age: 28
End: 2023-11-06
Payer: COMMERCIAL

## 2023-11-06 VITALS
HEIGHT: 63 IN | DIASTOLIC BLOOD PRESSURE: 70 MMHG | SYSTOLIC BLOOD PRESSURE: 115 MMHG | BODY MASS INDEX: 22.15 KG/M2 | WEIGHT: 125 LBS

## 2023-11-06 DIAGNOSIS — N93.9 ABNORMAL UTERINE BLEEDING (AUB): Primary | ICD-10-CM

## 2023-11-06 PROCEDURE — 1160F RVW MEDS BY RX/DR IN RCRD: CPT | Performed by: NURSE PRACTITIONER

## 2023-11-06 PROCEDURE — 99212 OFFICE O/P EST SF 10 MIN: CPT | Performed by: NURSE PRACTITIONER

## 2023-11-06 PROCEDURE — 1159F MED LIST DOCD IN RCRD: CPT | Performed by: NURSE PRACTITIONER

## 2023-11-06 NOTE — PROGRESS NOTES
Chief Complaint   Patient presents with    Follow-up     Endometrial polyp        Subjective   HPI  Lluvia Franks is a 27 y.o. female, .  Patient's last menstrual period was 10/16/2023 (exact date). who presents for follow up on endometrial polyp.      At her last visit on 23 she underwent a TVUS that revealed a hypoechoic area 2 x 2 x 2 mm. The plan was for her to return in 3 months for a follow up TVUS. Since then she reports her symptoms/issue has remained unchanged. The patient reports additional symptoms as  she reports a twinge of pain to her lower abdomen a few days ago with pinkish discharge. She cannot recall which side of her abdomen it was on. She reports the pain only lasted part of the day. .      TVUS today revealed an endometrial thickness of 7.2 mm, free fluid visualized, largest pool measuring 29.2 mm x 36.5 mm x 12.1 mm, low level diffuse echoes with adhesions noted   Menses are monthly with no further episodes of BTB      Additional OB/GYN History     Last Pap : 1/10/22- negative, HPV negative, STD negative   Last Completed Pap Smear            PAP SMEAR (Every 3 Years) Next due on 1/10/2025      01/10/2022  PAP SMEAR SCANNED    12/15/2020  Pap IG, Ct-Ng, HPV-hr    2018  Done - Negative, GC/Ch negative                    Last mammogram: never  Last Completed Mammogram       This patient has no relevant Health Maintenance data.            Tobacco Usage?: Yes Lluvia Franks  reports that she has been smoking cigarettes. She has a 13.00 pack-year smoking history. She has never used smokeless tobacco..       OB History          2    Para   1    Term   1            AB   1    Living   1         SAB        IAB        Ectopic        Molar        Multiple        Live Births   1                No current outpatient medications on file.     Past Medical History:   Diagnosis Date    Raynaud disease         Past Surgical History:   Procedure Laterality Date    NO  "PAST SURGERIES      NO PAST SURGERIES         The additional following portions of the patient's history were reviewed and updated as appropriate: allergies and current medications.    Review of Systems   Constitutional: Negative.    Respiratory: Negative.     Cardiovascular: Negative.    Gastrointestinal: Negative.    Genitourinary:  Positive for pelvic pain (twinge of pain to lower abdomen a few days ago,resolved now).       I have reviewed and agree with the HPI, ROS, and historical information as entered above. Sachi Cooley, APRN      Objective   /70   Ht 160 cm (62.99\")   Wt 56.7 kg (125 lb)   LMP 10/16/2023 (Exact Date)   BMI 22.15 kg/m²     Physical Exam  Constitutional:       Appearance: Normal appearance.   Pulmonary:      Effort: Pulmonary effort is normal.   Neurological:      Mental Status: She is alert.         Assessment & Plan     Assessment     Problem List Items Addressed This Visit    None  Visit Diagnoses       Abnormal uterine bleeding (AUB)    -  Primary            She has not had further episodes of BTB. U/S reveals the possible polyp has resolved. KW reviewed U/S images with fluid in CDS. She could have ruptured a cyst recently, there was fluid noted previously as well. She declines contraception to prevent recurrent ovarian cysts.     Plan     Return if symptoms worsen or fail to improve, for Annual physical.        Sachi Cooley, APRN  11/06/2023   "

## 2024-08-05 ENCOUNTER — OFFICE VISIT (OUTPATIENT)
Dept: FAMILY MEDICINE CLINIC | Facility: CLINIC | Age: 29
End: 2024-08-05
Payer: COMMERCIAL

## 2024-08-05 VITALS
DIASTOLIC BLOOD PRESSURE: 64 MMHG | RESPIRATION RATE: 16 BRPM | HEART RATE: 117 BPM | WEIGHT: 127 LBS | BODY MASS INDEX: 22.5 KG/M2 | TEMPERATURE: 98 F | HEIGHT: 63 IN | SYSTOLIC BLOOD PRESSURE: 122 MMHG

## 2024-08-05 DIAGNOSIS — Z00.00 WELL WOMAN EXAM (NO GYNECOLOGICAL EXAM): Primary | ICD-10-CM

## 2024-08-05 DIAGNOSIS — R53.82 CHRONIC FATIGUE: ICD-10-CM

## 2024-08-05 DIAGNOSIS — Z72.0 TOBACCO ABUSE: ICD-10-CM

## 2024-08-05 DIAGNOSIS — Z83.42 FAMILY HISTORY OF HYPERCHOLESTEROLEMIA: ICD-10-CM

## 2024-08-05 PROCEDURE — 99395 PREV VISIT EST AGE 18-39: CPT | Performed by: FAMILY MEDICINE

## 2024-08-05 PROCEDURE — 2014F MENTAL STATUS ASSESS: CPT | Performed by: FAMILY MEDICINE

## 2024-08-05 NOTE — PROGRESS NOTES
"Subjective     Chief Complaint   Patient presents with    Annual Exam       Lluvia Franks is a 28 y.o. female who presents for an annual exam. The patient has no complaints today. The patient is sexually active. GYN screening history: last pap: approximate date 2022 and was normal. The patient wears seatbelts: yes. The patient participates in regular exercise: yes with walking.  The patient reports that there is not domestic violence in her life.   She is up to date on her dentis visits.    History of abnormal Pap smear: no  Family history of uterine or ovarian cancer: no  Family history of colon cancer: paternal GM  History of abnormal mammogram: no  Family history of breast cancer: no      Menstrual History:  OB History          2    Para   1    Term   1            AB   1    Living   1         SAB        IAB        Ectopic        Molar        Multiple        Live Births   1                 No LMP recorded.         The following portions of the patient's history were reviewed and updated as appropriate:vital signs, allergies, current medications, past family history, past medical history, past social history, past surgical history, and problem list    Review of Systems   A comprehensive review of systems was negative.     Objective     /64   Pulse 117   Temp 98 °F (36.7 °C)   Resp 16   Ht 160 cm (62.99\")   Wt 57.6 kg (127 lb)   BMI 22.50 kg/m²       Physical Exam  Vitals and nursing note reviewed.   Constitutional:       General: She is not in acute distress.     Appearance: Normal appearance. She is well-developed.   HENT:      Head: Normocephalic and atraumatic.      Right Ear: Tympanic membrane, ear canal and external ear normal.      Left Ear: Tympanic membrane, ear canal and external ear normal.      Nose: Nose normal.   Eyes:      Extraocular Movements: Extraocular movements intact.      Conjunctiva/sclera: Conjunctivae normal.      Comments: Central heterochromia on right "   Neck:      Thyroid: No thyromegaly.   Cardiovascular:      Rate and Rhythm: Normal rate and regular rhythm.      Heart sounds: Normal heart sounds. No murmur heard.  Pulmonary:      Effort: Pulmonary effort is normal. No respiratory distress.      Breath sounds: Normal breath sounds.   Abdominal:      General: Bowel sounds are normal. There is no distension.      Palpations: Abdomen is soft.      Tenderness: There is no abdominal tenderness.   Musculoskeletal:      Cervical back: Normal range of motion and neck supple.   Lymphadenopathy:      Cervical: No cervical adenopathy.   Skin:     General: Skin is warm and dry.   Neurological:      Mental Status: She is alert and oriented to person, place, and time.   Psychiatric:         Mood and Affect: Mood normal.         Behavior: Behavior normal.         Thought Content: Thought content normal.         Judgment: Judgment normal.             Assessment & Plan     ASSESSMENT  Healthy female exam.    1. Well woman exam (no gynecological exam)    2. Tobacco abuse    3. Family history of hypercholesterolemia    4. Chronic fatigue         PLAN  1.   Orders Placed This Encounter   Procedures    Lipid Panel    Comprehensive Metabolic Panel    TSH    CBC & Differential       2. Medications prescribed this encounter:    No orders of the defined types were placed in this encounter.      3. Counseled pt about well adult care.  Will check basic labs and f/u pending results    She is up to date on her pap,      Washington Delgado MD  8/5/2024

## 2024-08-06 ENCOUNTER — LAB (OUTPATIENT)
Dept: FAMILY MEDICINE CLINIC | Facility: CLINIC | Age: 29
End: 2024-08-06
Payer: COMMERCIAL

## 2024-08-07 LAB
ALBUMIN SERPL-MCNC: 4.5 G/DL (ref 3.5–5.2)
ALBUMIN/GLOB SERPL: 2.3 G/DL
ALP SERPL-CCNC: 67 U/L (ref 39–117)
ALT SERPL-CCNC: 9 U/L (ref 1–33)
AST SERPL-CCNC: 11 U/L (ref 1–32)
BASOPHILS # BLD AUTO: 0.07 10*3/MM3 (ref 0–0.2)
BASOPHILS NFR BLD AUTO: 0.9 % (ref 0–1.5)
BILIRUB SERPL-MCNC: 0.4 MG/DL (ref 0–1.2)
BUN SERPL-MCNC: 8 MG/DL (ref 6–20)
BUN/CREAT SERPL: 8.5 (ref 7–25)
CALCIUM SERPL-MCNC: 9.4 MG/DL (ref 8.6–10.5)
CHLORIDE SERPL-SCNC: 105 MMOL/L (ref 98–107)
CHOLEST SERPL-MCNC: 164 MG/DL (ref 0–200)
CO2 SERPL-SCNC: 23.2 MMOL/L (ref 22–29)
CREAT SERPL-MCNC: 0.94 MG/DL (ref 0.57–1)
EGFRCR SERPLBLD CKD-EPI 2021: 84.9 ML/MIN/1.73
EOSINOPHIL # BLD AUTO: 0.25 10*3/MM3 (ref 0–0.4)
EOSINOPHIL NFR BLD AUTO: 3.3 % (ref 0.3–6.2)
ERYTHROCYTE [DISTWIDTH] IN BLOOD BY AUTOMATED COUNT: 12.2 % (ref 12.3–15.4)
GLOBULIN SER CALC-MCNC: 2 GM/DL
GLUCOSE SERPL-MCNC: 96 MG/DL (ref 65–99)
HCT VFR BLD AUTO: 47.9 % (ref 34–46.6)
HDLC SERPL-MCNC: 37 MG/DL (ref 40–60)
HGB BLD-MCNC: 15.5 G/DL (ref 12–15.9)
IMM GRANULOCYTES # BLD AUTO: 0.02 10*3/MM3 (ref 0–0.05)
IMM GRANULOCYTES NFR BLD AUTO: 0.3 % (ref 0–0.5)
LDLC SERPL CALC-MCNC: 111 MG/DL (ref 0–100)
LYMPHOCYTES # BLD AUTO: 3.11 10*3/MM3 (ref 0.7–3.1)
LYMPHOCYTES NFR BLD AUTO: 40.8 % (ref 19.6–45.3)
MCH RBC QN AUTO: 29.9 PG (ref 26.6–33)
MCHC RBC AUTO-ENTMCNC: 32.4 G/DL (ref 31.5–35.7)
MCV RBC AUTO: 92.3 FL (ref 79–97)
MONOCYTES # BLD AUTO: 0.54 10*3/MM3 (ref 0.1–0.9)
MONOCYTES NFR BLD AUTO: 7.1 % (ref 5–12)
NEUTROPHILS # BLD AUTO: 3.63 10*3/MM3 (ref 1.7–7)
NEUTROPHILS NFR BLD AUTO: 47.6 % (ref 42.7–76)
NRBC BLD AUTO-RTO: 0 /100 WBC (ref 0–0.2)
PLATELET # BLD AUTO: 280 10*3/MM3 (ref 140–450)
POTASSIUM SERPL-SCNC: 4.2 MMOL/L (ref 3.5–5.2)
PROT SERPL-MCNC: 6.5 G/DL (ref 6–8.5)
RBC # BLD AUTO: 5.19 10*6/MM3 (ref 3.77–5.28)
SODIUM SERPL-SCNC: 139 MMOL/L (ref 136–145)
TRIGL SERPL-MCNC: 83 MG/DL (ref 0–150)
TSH SERPL DL<=0.005 MIU/L-ACNC: 3.1 UIU/ML (ref 0.27–4.2)
VLDLC SERPL CALC-MCNC: 16 MG/DL (ref 5–40)
WBC # BLD AUTO: 7.62 10*3/MM3 (ref 3.4–10.8)

## 2024-11-19 ENCOUNTER — OFFICE VISIT (OUTPATIENT)
Dept: OBSTETRICS AND GYNECOLOGY | Facility: CLINIC | Age: 29
End: 2024-11-19
Payer: COMMERCIAL

## 2024-11-19 VITALS
WEIGHT: 131.6 LBS | HEIGHT: 63 IN | BODY MASS INDEX: 23.32 KG/M2 | DIASTOLIC BLOOD PRESSURE: 80 MMHG | SYSTOLIC BLOOD PRESSURE: 118 MMHG

## 2024-11-19 DIAGNOSIS — N92.6 IRREGULAR MENSES: ICD-10-CM

## 2024-11-19 DIAGNOSIS — R10.2 PELVIC PAIN: Primary | ICD-10-CM

## 2024-11-19 LAB
B-HCG UR QL: NEGATIVE
BILIRUB BLD-MCNC: NEGATIVE MG/DL
CLARITY, POC: CLEAR
COLOR UR: YELLOW
EXPIRATION DATE: NORMAL
GLUCOSE UR STRIP-MCNC: NEGATIVE MG/DL
INTERNAL NEGATIVE CONTROL: NEGATIVE
INTERNAL POSITIVE CONTROL: NORMAL
KETONES UR QL: NEGATIVE
LEUKOCYTE EST, POC: NEGATIVE
Lab: NORMAL
NITRITE UR-MCNC: NEGATIVE MG/ML
PH UR: 6 [PH] (ref 5–8)
PROT UR STRIP-MCNC: NEGATIVE MG/DL
RBC # UR STRIP: ABNORMAL /UL
SP GR UR: 1.01 (ref 1–1.03)
UROBILINOGEN UR QL: NORMAL

## 2024-11-19 NOTE — PROGRESS NOTES
Chief Complaint   Patient presents with    Pelvic Pain   Missed menses    Subjective   HPI  Lluvia Franks is a 29 y.o. female, .  Patient's last menstrual period was 10/03/2024 (approximate).. who presents for missed menses with pelvic pain.    Patient reports that her period is 16 days late.  Prior to this, her periods are regular, every 25-41 days, lasting x 5 days.  She reports taking a UPT on  that was negative.  She reports dull aching pain in her lower abdomen last week, and a few episodes of sharp pain on .  Pain has since resolved.     She denies use of anything for contraception.  She denies new sexual partners.       Additional OB/GYN History     Last Pap : 1/10/2022  Last Completed Pap Smear            PAP SMEAR (Every 3 Years) Next due on 1/10/2025      01/10/2022  PAP SMEAR SCANNED    12/15/2020  Pap IG, Ct-Ng, HPV-hr    2018  Done - Negative, GC/Ch negative                  Tobacco Usage?: Yes Lluvia Franks  reports that she has been smoking cigarettes. She has a 13 pack-year smoking history. She has never used smokeless tobacco.       OB History          2    Para   1    Term   1            AB   1    Living   1         SAB        IAB        Ectopic        Molar        Multiple        Live Births   1                No current outpatient medications on file.     Past Medical History:   Diagnosis Date    Ovarian cyst     PMS (premenstrual syndrome)     Raynaud disease     Varicella         Past Surgical History:   Procedure Laterality Date    NO PAST SURGERIES      NO PAST SURGERIES         The additional following portions of the patient's history were reviewed and updated as appropriate: allergies, current medications, past family history, past medical history, past social history, past surgical history, and problem list.    Review of Systems   Constitutional: Negative.    Genitourinary:  Positive for menstrual problem and pelvic pain.       I have  "reviewed and agree with the HPI, ROS, and historical information as entered above. Sachi Cooley, APRN      Objective   /80   Ht 160 cm (62.99\")   Wt 59.7 kg (131 lb 9.6 oz)   LMP 10/03/2024 (Approximate)   BMI 23.32 kg/m²     Physical Exam  Constitutional:       Appearance: Normal appearance.   Pulmonary:      Effort: Pulmonary effort is normal.   Neurological:      Mental Status: She is alert.         Assessment & Plan     Assessment     Problem List Items Addressed This Visit    None  Visit Diagnoses       Pelvic pain    -  Primary    Relevant Orders    POC Urinalysis Dipstick (Completed)    POC Pregnancy, Urine (Completed)    Urine Culture - Urine, Urine, Clean Catch    US Non-ob Transvaginal    Irregular menses        Relevant Orders    POC Pregnancy, Urine (Completed)    Follicle Stimulating Hormone    Estradiol    Prolactin    Testosterone Free Direct    US Non-ob Transvaginal            Pain has now resolved, she has had a hemorrhagic cyst before so got worried. She occasionally has irregular menses but typically they are regular. UPT was negative here and at home. She uses condoms for birth control. Will do labs today.    Plan     Labs as above. If normal, discussed it is okay to have long cycle occasionally, not interested in contraception for menstrual control and not TTC.  Return in about 2 weeks (around 12/3/2024) for Annual physical, U/S.        Sachi Cooley, TRISTEN  11/19/2024   "

## 2024-11-21 LAB
BACTERIA UR CULT: NO GROWTH
BACTERIA UR CULT: NORMAL

## 2024-11-24 LAB
ESTRADIOL SERPL-MCNC: 123 PG/ML
FSH SERPL-ACNC: 4.8 MIU/ML
PROLACTIN SERPL-MCNC: 11.2 NG/ML (ref 4.8–33.4)
TESTOST FREE SERPL-MCNC: 1.2 PG/ML (ref 0–4.2)